# Patient Record
Sex: FEMALE | Race: WHITE | NOT HISPANIC OR LATINO | Employment: OTHER | URBAN - METROPOLITAN AREA
[De-identification: names, ages, dates, MRNs, and addresses within clinical notes are randomized per-mention and may not be internally consistent; named-entity substitution may affect disease eponyms.]

---

## 2024-01-01 ENCOUNTER — TELEPHONE (OUTPATIENT)
Dept: INTERNAL MEDICINE CLINIC | Facility: OTHER | Age: 89
End: 2024-01-01

## 2024-10-21 ENCOUNTER — TELEPHONE (OUTPATIENT)
Age: 89
End: 2024-10-21

## 2024-10-21 NOTE — TELEPHONE ENCOUNTER
Received call from daughter. States that patient is going to be becoming a patient of the practice. She currently resides at [facility]. They need office to send over something to office requesting medical records. The fax number for them is [phone]. They will then fax over her records, and the facility will call to get her new patient visit set up.   Please advise.

## 2024-10-22 NOTE — TELEPHONE ENCOUNTER
Patients daughter stopped by office to sign medical records release form.  Form was faxed to [phone] and scanned into patients chart.

## 2024-10-22 NOTE — TELEPHONE ENCOUNTER
Called and spoke to patients daughter to find out who her previous primary care doctor was.  She used to see Reji Rob at  [facility].  Form has been completed and daughter will stop by the office this afternoon to sign release form so that we can fax request to Dr. Rob' Office.

## 2024-10-30 NOTE — TELEPHONE ENCOUNTER
Pt's daughter returning call; she did call the insurance and they stated it would be effective as of 11/1 which appt is rescheduled for now w/ Barbara Guerra. Did not obtain a ref # at time of call.

## 2024-10-30 NOTE — TELEPHONE ENCOUNTER
Received an email from Cammie @ [facility] looking to get PT/OT orders placed as soon as possible. I advised her that Dr. Wing is out of the office and if they need these orders ASAP Isaura would need to be seen in the office by one of our other providers.    Before Isaura can be seen, her PCP under her [employer] insurance needs to be changed to either our office or one of our providers in our office. I called and spoke with Marcie (patients daughter) and informed her [facility] would like to get her seen ASAP for these orders and asked her to call and get the PCP switched so we can get her in for an appointment. Marcie understood and told me she would be heading over to see her mom later today and will work on the switch with Isaura.     Also told Marcie to tell insurance to make the change effective immediately and to obtain a ref # for our documentation

## 2024-11-01 ENCOUNTER — TELEPHONE (OUTPATIENT)
Age: 89
End: 2024-11-01

## 2024-11-01 ENCOUNTER — OFFICE VISIT (OUTPATIENT)
Dept: INTERNAL MEDICINE CLINIC | Facility: OTHER | Age: 89
End: 2024-11-01
Payer: COMMERCIAL

## 2024-11-01 VITALS
SYSTOLIC BLOOD PRESSURE: 110 MMHG | OXYGEN SATURATION: 99 % | DIASTOLIC BLOOD PRESSURE: 68 MMHG | BODY MASS INDEX: 39.49 KG/M2 | HEART RATE: 71 BPM | WEIGHT: 237 LBS | HEIGHT: 65 IN | TEMPERATURE: 97.2 F

## 2024-11-01 DIAGNOSIS — S91.301A NON-HEALING WOUND OF RIGHT HEEL: ICD-10-CM

## 2024-11-01 DIAGNOSIS — I10 ESSENTIAL HYPERTENSION: Primary | ICD-10-CM

## 2024-11-01 DIAGNOSIS — Z95.3 S/P TAVR (TRANSCATHETER AORTIC VALVE REPLACEMENT), BIOPROSTHETIC: ICD-10-CM

## 2024-11-01 DIAGNOSIS — E55.9 VITAMIN D DEFICIENCY: ICD-10-CM

## 2024-11-01 DIAGNOSIS — Z87.81 S/P ORIF (OPEN REDUCTION INTERNAL FIXATION) FRACTURE: ICD-10-CM

## 2024-11-01 DIAGNOSIS — I50.32 CHRONIC DIASTOLIC HEART FAILURE (HCC): ICD-10-CM

## 2024-11-01 DIAGNOSIS — N18.4 CHRONIC KIDNEY DISEASE, STAGE 4 (SEVERE) (HCC): Chronic | ICD-10-CM

## 2024-11-01 DIAGNOSIS — S72.91XD CLOSED FRACTURE OF RIGHT FEMUR WITH ROUTINE HEALING, UNSPECIFIED FRACTURE MORPHOLOGY, UNSPECIFIED PORTION OF FEMUR, SUBSEQUENT ENCOUNTER: ICD-10-CM

## 2024-11-01 DIAGNOSIS — I73.9 PVD (PERIPHERAL VASCULAR DISEASE) (HCC): ICD-10-CM

## 2024-11-01 DIAGNOSIS — E21.3 HYPERPARATHYROIDISM (HCC): ICD-10-CM

## 2024-11-01 DIAGNOSIS — Z98.890 S/P ORIF (OPEN REDUCTION INTERNAL FIXATION) FRACTURE: ICD-10-CM

## 2024-11-01 DIAGNOSIS — Z76.89 ESTABLISHING CARE WITH NEW DOCTOR, ENCOUNTER FOR: ICD-10-CM

## 2024-11-01 DIAGNOSIS — D50.0 IRON DEFICIENCY ANEMIA DUE TO CHRONIC BLOOD LOSS: ICD-10-CM

## 2024-11-01 DIAGNOSIS — E89.0 POSTOPERATIVE HYPOTHYROIDISM: ICD-10-CM

## 2024-11-01 DIAGNOSIS — K21.9 GASTROESOPHAGEAL REFLUX DISEASE, UNSPECIFIED WHETHER ESOPHAGITIS PRESENT: ICD-10-CM

## 2024-11-01 DIAGNOSIS — E11.65 TYPE 2 DIABETES MELLITUS WITH HYPERGLYCEMIA, WITHOUT LONG-TERM CURRENT USE OF INSULIN (HCC): ICD-10-CM

## 2024-11-01 DIAGNOSIS — I50.32 CHRONIC DIASTOLIC (CONGESTIVE) HEART FAILURE (HCC): ICD-10-CM

## 2024-11-01 DIAGNOSIS — I25.10 ARTERIOSCLEROSIS OF CORONARY ARTERY: ICD-10-CM

## 2024-11-01 DIAGNOSIS — R26.2 AMBULATORY DYSFUNCTION: ICD-10-CM

## 2024-11-01 DIAGNOSIS — S91.302A NON-HEALING WOUND OF LEFT HEEL: ICD-10-CM

## 2024-11-01 DIAGNOSIS — I48.92 ATRIAL FLUTTER, UNSPECIFIED TYPE (HCC): ICD-10-CM

## 2024-11-01 PROBLEM — I95.9 HYPOTENSION: Status: RESOLVED | Noted: 2024-11-01 | Resolved: 2024-11-01

## 2024-11-01 PROBLEM — E87.1 HYPONATREMIA: Status: RESOLVED | Noted: 2024-08-22 | Resolved: 2024-11-01

## 2024-11-01 PROBLEM — N18.32 STAGE 3B CHRONIC KIDNEY DISEASE (HCC): Status: ACTIVE | Noted: 2020-07-21

## 2024-11-01 PROBLEM — K76.0 STEATOSIS OF LIVER: Status: ACTIVE | Noted: 2022-02-11

## 2024-11-01 PROBLEM — I95.9 HYPOTENSION: Status: ACTIVE | Noted: 2024-11-01

## 2024-11-01 PROBLEM — U07.1 COVID-19 VIRUS INFECTION: Status: RESOLVED | Noted: 2024-11-01 | Resolved: 2024-11-01

## 2024-11-01 PROBLEM — Z95.2 HISTORY OF AORTIC VALVE REPLACEMENT: Status: ACTIVE | Noted: 2018-06-14

## 2024-11-01 PROBLEM — E87.1 HYPONATREMIA: Status: ACTIVE | Noted: 2024-08-22

## 2024-11-01 PROBLEM — N32.81 OAB (OVERACTIVE BLADDER): Status: ACTIVE | Noted: 2024-09-23

## 2024-11-01 PROBLEM — E66.01 MORBID OBESITY (HCC): Status: RESOLVED | Noted: 2018-06-28 | Resolved: 2024-11-01

## 2024-11-01 PROBLEM — I5A MYOCARDIAL INJURY: Status: ACTIVE | Noted: 2024-09-23

## 2024-11-01 PROBLEM — E66.01 MORBID OBESITY (HCC): Status: ACTIVE | Noted: 2018-06-28

## 2024-11-01 PROBLEM — B35.3 TINEA PEDIS OF BOTH FEET: Status: ACTIVE | Noted: 2024-05-23

## 2024-11-01 PROBLEM — L85.8 KERATOACANTHOMA: Status: ACTIVE | Noted: 2024-07-23

## 2024-11-01 PROBLEM — N17.9 AKI (ACUTE KIDNEY INJURY) (HCC): Status: ACTIVE | Noted: 2024-09-22

## 2024-11-01 PROBLEM — M17.10 ARTHRITIS OF KNEE: Status: ACTIVE | Noted: 2024-07-23

## 2024-11-01 PROBLEM — D64.9 ANEMIA: Status: ACTIVE | Noted: 2024-01-09

## 2024-11-01 PROBLEM — S72.91XA FEMUR FRACTURE, RIGHT (HCC): Status: ACTIVE | Noted: 2024-08-20

## 2024-11-01 PROBLEM — U07.1 COVID-19 VIRUS INFECTION: Status: ACTIVE | Noted: 2024-11-01

## 2024-11-01 PROBLEM — N17.9 AKI (ACUTE KIDNEY INJURY) (HCC): Status: RESOLVED | Noted: 2024-09-22 | Resolved: 2024-11-01

## 2024-11-01 PROBLEM — Z95.1 POSTSURGICAL AORTOCORONARY BYPASS STATUS: Status: ACTIVE | Noted: 2018-06-14

## 2024-11-01 PROCEDURE — 99205 OFFICE O/P NEW HI 60 MIN: CPT | Performed by: NURSE PRACTITIONER

## 2024-11-01 RX ORDER — LANOLIN ALCOHOL/MO/W.PET/CERES
1000 CREAM (GRAM) TOPICAL DAILY
COMMUNITY
Start: 2024-06-25

## 2024-11-01 RX ORDER — SPIRONOLACTONE 25 MG/1
25 TABLET ORAL DAILY
COMMUNITY
Start: 2024-01-18 | End: 2025-01-17

## 2024-11-01 RX ORDER — AMMONIUM LACTATE 12 G/100G
CREAM TOPICAL
COMMUNITY
Start: 2024-10-22 | End: 2025-10-22

## 2024-11-01 RX ORDER — FLUTICASONE PROPIONATE 50 MCG
1 SPRAY, SUSPENSION (ML) NASAL 2 TIMES DAILY
COMMUNITY
Start: 2024-10-21

## 2024-11-01 RX ORDER — PANTOPRAZOLE SODIUM 40 MG/1
40 TABLET, DELAYED RELEASE ORAL DAILY
COMMUNITY
Start: 2024-10-21

## 2024-11-01 RX ORDER — METOPROLOL SUCCINATE 100 MG/1
100 TABLET, EXTENDED RELEASE ORAL DAILY
COMMUNITY
Start: 2024-10-21

## 2024-11-01 RX ORDER — FUROSEMIDE 20 MG/1
20 TABLET ORAL DAILY
COMMUNITY
Start: 2023-11-15 | End: 2024-11-14

## 2024-11-01 RX ORDER — ACETAMINOPHEN 325 MG/1
650 TABLET ORAL EVERY 4 HOURS PRN
COMMUNITY

## 2024-11-01 RX ORDER — OSTOMY SUPPLY
BOX MISCELLANEOUS
COMMUNITY
Start: 2024-10-21

## 2024-11-01 RX ORDER — ATORVASTATIN CALCIUM 40 MG/1
40 TABLET, FILM COATED ORAL DAILY
COMMUNITY
Start: 2024-10-21

## 2024-11-01 RX ORDER — LEVOTHYROXINE SODIUM 125 UG/1
125 TABLET ORAL DAILY
COMMUNITY
Start: 2024-10-21

## 2024-11-01 RX ORDER — GABAPENTIN 300 MG/1
300 CAPSULE ORAL 2 TIMES DAILY
COMMUNITY
Start: 2024-10-21

## 2024-11-01 RX ORDER — SOLIFENACIN SUCCINATE 10 MG/1
1 TABLET, FILM COATED ORAL DAILY
COMMUNITY
Start: 2024-07-22

## 2024-11-01 NOTE — TELEPHONE ENCOUNTER
Cammie is requesting to cancel Referrals for [facility] that were placed today. Instead she is asking referral for Visiting Nurse wound care with [facility]. Please reach outt to her for any questions.    Thank you!!

## 2024-11-01 NOTE — ASSESSMENT & PLAN NOTE
Using wheelchair status post hip fracture in August 2024  prior to that was using walker  Continue PT OT

## 2024-11-01 NOTE — ASSESSMENT & PLAN NOTE
Pressure well-controlled on current regimen  Continue Toprol  mg daily, spironolactone 25 mg daily and Lasix 20 mg daily

## 2024-11-01 NOTE — ASSESSMENT & PLAN NOTE
Wt Readings from Last 3 Encounters:   11/01/24 108 kg (237 lb)     Follows with [facility] cardiology  Managed with Lasix 20 mg daily  Continue daily weights

## 2024-11-01 NOTE — TELEPHONE ENCOUNTER
Why was I not made aware of the wounds prior to her visit today? We have asked countless times that we please be given all information on patient that needs to be addressed during the visit. I got a message from [facility] that she is already scheduled on 11/5. Does this need to now be cancelled? Is she able to have both [facility] and [facility]?

## 2024-11-01 NOTE — PROGRESS NOTES
Assessment/Plan:    Problem List Items Addressed This Visit       Ambulatory dysfunction     Using wheelchair status post hip fracture in August 2024  prior to that was using walker  Continue PT OT         Relevant Orders    Ambulatory Referral to Physical Therapy    Ambulatory Referral to Occupational Therapy    Arteriosclerosis of coronary artery     Follows with [facility] cardiology  Managed on atorvastatin 40 mg daily metoprolol  mg daily         Relevant Medications    metoprolol succinate (TOPROL-XL) 100 mg 24 hr tablet    Other Relevant Orders    Lipid Panel with Direct LDL reflex    Atrial flutter (HCC)     Follows with [facility] cardiology  Managed on Eliquis 5 mg twice daily metoprolol  mg daily         Relevant Medications    metoprolol succinate (TOPROL-XL) 100 mg 24 hr tablet    Chronic diastolic heart failure (HCC)     Wt Readings from Last 3 Encounters:   11/01/24 108 kg (237 lb)     Follows with [facility] cardiology  Managed with Lasix 20 mg daily  Continue daily weights               Relevant Medications    metoprolol succinate (TOPROL-XL) 100 mg 24 hr tablet    Chronic kidney disease, stage 4 (severe) (HCC) (Chronic)     Lab Results   Component Value Date    EGFR 41 (L) 09/25/2024    EGFR 33 (L) 09/24/2024    EGFR 19 (L) 09/23/2024    CREATININE 1.24 (H) 09/25/2024    CREATININE 1.51 (H) 09/24/2024    CREATININE 2.39 (H) 09/23/2024     Follows with  nephrology. Advised to scheduled follow up  Recently hospitalized for HERMAN 9/22/24 which was secondary to dehydration in the setting of covid illness   - recent Cr 1.24  -avoid nephrotoxins          Relevant Medications    furosemide (LASIX) 20 mg tablet    spironolactone (ALDACTONE) 25 mg tablet    Other Relevant Orders    Comprehensive metabolic panel    CBC and differential    Femur fracture, right (HCC)     - due to fall at home   - s/p ORIF 8/20 with [facility]  - currently weight bearing as tolerated  - needs referral to PT  and OT at [facility]   - currently using wheelchair. Was using walker prior to fall         Relevant Orders    Ambulatory Referral to Physical Therapy    Ambulatory Referral to Occupational Therapy    Establishing care with new doctor, encounter for     Medical history reviewed with patient and daughter in detail         Essential hypertension - Primary     Pressure well-controlled on current regimen  Continue Toprol  mg daily, spironolactone 25 mg daily and Lasix 20 mg daily         Relevant Medications    furosemide (LASIX) 20 mg tablet    metoprolol succinate (TOPROL-XL) 100 mg 24 hr tablet    spironolactone (ALDACTONE) 25 mg tablet    Esophageal reflux     Controlled on Protonix 40 mg daily         Relevant Medications    pantoprazole (PROTONIX) 40 mg tablet    Hyperparathyroidism ([facility])     Follows with nephrology  Update PTH and CMP         Relevant Orders    PTH, intact    Iron deficiency anemia due to chronic blood loss     Her daughter was previously secondary to severe aortic stenosis  Resolved status post TAVR         Relevant Medications    vitamin B-12 (VITAMIN B-12) 1,000 mcg tablet    Postoperative hypothyroidism     TSH normal  Continue levothyroxine 125 mcg daily         Relevant Medications    levothyroxine 125 mcg tablet    metoprolol succinate (TOPROL-XL) 100 mg 24 hr tablet    Other Relevant Orders    TSH, 3rd generation with Free T4 reflex    PVD (peripheral vascular disease) ([facility])     Continue atorvastatin 40 mg daily         S/p TAVR (transcatheter aortic valve replacement), bioprosthetic      severe stenosis of her prior Corona Lilia TAVR valve from 2018 and she underwent successful TAVR in TAVR with implantation of a 26 mm Medtronic Evolut FX on 2/15/24  -Follows with [facility] cardiology         Type 2 diabetes mellitus with hyperglycemia, without long-term current use of insulin ([facility])       Lab Results   Component Value Date    HGBA1C 6.5 (H) 09/04/2024  "    Diet controlled         Relevant Orders    Hemoglobin A1C    Vitamin D deficiency     Update vitamin D level         Relevant Orders    Vitamin D 25 hydroxy     Other Visit Diagnoses       S/P ORIF (open reduction internal fixation) fracture        Relevant Orders    Ambulatory Referral to Physical Therapy    Ambulatory Referral to Occupational Therapy    Chronic diastolic (congestive) heart failure (HCC)        Relevant Medications    metoprolol succinate (TOPROL-XL) 100 mg 24 hr tablet    Non-healing wound of right heel        Relevant Orders    Ambulatory Referral to Wound Care    Non-healing wound of left heel        Relevant Orders    Ambulatory Referral to Wound Care            BMI Counseling: Body mass index is 39.44 kg/m².          [employer]) software was used to dictate this note.  It may contain errors with dictating incorrect words or incorrect spelling. Please contact the provider directly with any questions.    Subjective:      Patient ID: Isaura Burnett is a 89 y.o. female.    HPI    Patient presents today to establish care with our office  She was previously following with Dr Rob with  internal medicine.  She is accompanied by her daughter Hina.   She recently moved in to [facility] 2 weeks ago. Prior to that she was at [facility] for rehab after she had a right hip fracture s/p ORIF on 8/20/24 after a fall at home.  She is following with [facility].  She is currently weightbearing as tolerated to the right lower extremity.   She needs a referral for PT and OT today     DM2- Hba1c 6.5, diet controlled     Hypothyroidism - TSH normal 2.64.  Managed on levothyroxine 125 mcg daily    Follows with  cardiology Dr Yee   -History of coronary artery disease  -History of heart block requiring permanent pacemaker  -History of chronic diastolic heart failure-managed with Lasix 20 mg daily  -Status post TAVR \"she had severe stenosis of her prior Corona Lilia TAVR valve " "from 2018 and she underwent successful TAVR in TAVR with implantation of a 26 mm Medtronic Evolut FX on 2/15/24. By exam and clinically the valve is functioning normally. I personally reviewed her 30 day echo done earlier today which has not yet been officially interpreted. It is a technically difficult study with poor visualization of the TAVR valves, but the valve is well seated and there does not appear to be significant regurgitation or paravalvular leak.\" Copied from last cardiology follow up 3/18/24  -Her daughter reports that her history of anemia was secondary to her severe aortic stenosis.  He states her anemia has resolved after her TAVR    Follows with  nephrology Dr Sadler -history of stage IV chronic kidney disease.  Per last nephrology note baseline (1.5 - 1.6 mg/dL). Most recent Cr 1.24 after recent hospitalization for HERMAN secondary to dehydration secondary to COVID virus.  He is due for follow-up with nephrology        The following portions of the patient's history were reviewed and updated as appropriate: allergies, current medications, past family history, past medical history, past social history, past surgical history, and problem list.    Review of Systems   Constitutional:  Negative for activity change, appetite change and unexpected weight change.   Respiratory:  Negative for cough, chest tightness and shortness of breath.    Cardiovascular:  Positive for leg swelling. Negative for chest pain.         Past Medical History:   Diagnosis Date    A-fib ([facility])     HERMAN (acute kidney injury) ([facility]) 09/22/2024    CHF (congestive heart failure) ([facility])     COVID-19 virus infection 11/01/2024         Current Outpatient Medications:     acetaminophen (TYLENOL) 325 mg tablet, Take 650 mg by mouth every 4 (four) hours as needed for mild pain or fever, Disp: , Rfl:     ammonium lactate (LAC-HYDRIN) 12 % cream, Apply topically, Disp: , Rfl:     apixaban (ELIQUIS) 5 mg, Take 5 mg by mouth 2 (two) times a day, " "Disp: , Rfl:     atorvastatin (LIPITOR) 40 mg tablet, Take 40 mg by mouth daily, Disp: , Rfl:     fluticasone (FLONASE) 50 mcg/act nasal spray, 1 spray into each nostril 2 (two) times a day, Disp: , Rfl:     furosemide (LASIX) 20 mg tablet, Take 20 mg by mouth daily, Disp: , Rfl:     gabapentin (NEURONTIN) 300 mg capsule, Take 300 mg by mouth 2 (two) times a day, Disp: , Rfl:     levothyroxine 125 mcg tablet, Take 125 mcg by mouth daily, Disp: , Rfl:     Menthol, Topical Analgesic, (BIOFREEZE ROLL-ON EX), Apply topically 2 (two) times a day, Disp: , Rfl:     metoprolol succinate (TOPROL-XL) 100 mg 24 hr tablet, Take 100 mg by mouth daily, Disp: , Rfl:     Ostomy Supplies (Skin Prep Wipes) MISC, Apply to right heel twice daily, Disp: , Rfl:     pantoprazole (PROTONIX) 40 mg tablet, Take 40 mg by mouth daily, Disp: , Rfl:     solifenacin (VESICARE) 10 MG tablet, Take 1 tablet by mouth daily, Disp: , Rfl:     spironolactone (ALDACTONE) 25 mg tablet, Take 25 mg by mouth daily, Disp: , Rfl:     vitamin B-12 (VITAMIN B-12) 1,000 mcg tablet, Take 1,000 mcg by mouth daily, Disp: , Rfl:     No Known Allergies    Social History   Past Surgical History:   Procedure Laterality Date    BLADDER SUSPENSION      CARDIAC PACEMAKER PLACEMENT  02/2024    TVAR 2 weeks prior    CATARACT EXTRACTION      CHOLECYSTECTOMY      FEMUR SURGERY Right 08/2024    THYROIDECTOMY       Family History   Problem Relation Age of Onset    Heart disease Mother     Colon cancer Father        Objective:  /68 (BP Location: Left arm, Patient Position: Sitting, Cuff Size: Large)   Pulse 71   Temp (!) 97.2 °F (36.2 °C) (Temporal)   Ht 5' 5\" (1.651 m) Comment: pt verbal  Wt 108 kg (237 lb) Comment: pt verbal- pt was weighed this AM  SpO2 99%   BMI 39.44 kg/m²      Physical Exam  Vitals reviewed.   Constitutional:       General: She is not in acute distress.     Appearance: Normal appearance. She is obese. She is not diaphoretic.   HENT:      Right " Ear: Tympanic membrane and external ear normal.      Left Ear: Tympanic membrane and external ear normal.   Eyes:      Conjunctiva/sclera: Conjunctivae normal.   Cardiovascular:      Rate and Rhythm: Normal rate and regular rhythm.   Pulmonary:      Effort: Pulmonary effort is normal. No respiratory distress.      Breath sounds: Normal breath sounds. No wheezing, rhonchi or rales.   Musculoskeletal:      Right lower leg: Edema (+2 nonpitting) present.      Left lower leg: Edema (+2 nonpitting) present.   Neurological:      Mental Status: She is alert and oriented to person, place, and time. Mental status is at baseline.      Gait: Gait abnormal (in wheelchair).   Psychiatric:         Mood and Affect: Mood normal.         Behavior: Behavior normal.         Thought Content: Thought content normal.         Judgment: Judgment normal.

## 2024-11-01 NOTE — ASSESSMENT & PLAN NOTE
severe stenosis of her prior Corona Lilia TAVR valve from 2018 and she underwent successful TAVR in TAVR with implantation of a 26 mm Medtronic Evolut FX on 2/15/24  -Follows with [facility]

## 2024-11-01 NOTE — ASSESSMENT & PLAN NOTE
- due to fall at home   - s/p ORIF 8/20 with [facility]  - currently weight bearing as tolerated  - needs referral to PT and OT at [facility]   - currently using wheelchair. Was using walker prior to fall

## 2024-11-01 NOTE — TELEPHONE ENCOUNTER
Called and spoke with Cammie- Aware [facility] order with be Discontinued, but [facility] needs to contact the patient 's dgt to cancel appt with lucy on 11/5.  Per Rosa, Order will be placed for wound care for bilateral heel wounds to [facility] and referral to [facility]. Per Cammie from past resident's when they were referred to [facility] for wound care, [facility] then sent orders for PHYSICAL THERAPY/ OT

## 2024-11-01 NOTE — TELEPHONE ENCOUNTER
The patient already has an appointment. I am confused, I was told at the time of the visit that she could not use [facility] because they didn't not accept her insurance. The daughter said she was told [facility] goes there so that is why we put inh er PT/OT referrals for [facility]. [facility]? I'm confused why the wound care order affects the PT/OT order

## 2024-11-01 NOTE — ASSESSMENT & PLAN NOTE
Follows with [facility] cardiology  Managed on Eliquis 5 mg twice daily metoprolol  mg daily

## 2024-11-01 NOTE — ASSESSMENT & PLAN NOTE
Follows with [facility] cardiology  Managed on atorvastatin 40 mg daily metoprolol  mg daily

## 2024-11-01 NOTE — TELEPHONE ENCOUNTER
Cammie was reaching out with the following request:    Staff just informed me that Isaura has wounds on her heels that are re-opening from where she was prior to us... want to get her with [facility] if possible for wound care, because they can then put a referral for [facility] to see her through them.    She is asking if the referrals for [facility] can be canceled as soon as possible before they start the process of contacting the patient.

## 2024-11-01 NOTE — TELEPHONE ENCOUNTER
Reached out to Cammie about the situation.  She apologizes that the issue was not written on the paperwork.  She states that it was communicated to her late last night.  She is asking if the referrals for [facility] could be canceled and changed to [facility] because [facility] communicates the patients needs to them more then [facility] does.  She said that it is your decision though on what you would prefer to do.

## 2024-11-01 NOTE — ASSESSMENT & PLAN NOTE
Lab Results   Component Value Date    EGFR 41 (L) 09/25/2024    EGFR 33 (L) 09/24/2024    EGFR 19 (L) 09/23/2024    CREATININE 1.24 (H) 09/25/2024    CREATININE 1.51 (H) 09/24/2024    CREATININE 2.39 (H) 09/23/2024     Follows with  nephrology. Advised to scheduled follow up  Recently hospitalized for HERMAN 9/22/24 which was secondary to dehydration in the setting of covid illness   - recent Cr 1.24  -avoid nephrotoxins

## 2024-11-07 ENCOUNTER — TELEPHONE (OUTPATIENT)
Dept: INTERNAL MEDICINE CLINIC | Facility: OTHER | Age: 89
End: 2024-11-07

## 2024-11-07 NOTE — TELEPHONE ENCOUNTER
Patients daughter dropped off a [employer] form to be completed by Barbara Guerra.  The form has been scanned into patients chart and placed on Rosa's desk to address.  Patients daughter was made aware that there will be a $25 forms fee upon completion.

## 2024-11-08 ENCOUNTER — TELEPHONE (OUTPATIENT)
Dept: INTERNAL MEDICINE CLINIC | Facility: OTHER | Age: 89
End: 2024-11-08

## 2024-11-08 DIAGNOSIS — L30.4 INTERTRIGO: Primary | ICD-10-CM

## 2024-11-08 RX ORDER — NYSTATIN 100000 [USP'U]/G
POWDER TOPICAL 3 TIMES DAILY
Qty: 60 G | Refills: 2 | Status: SHIPPED | OUTPATIENT
Start: 2024-11-08

## 2024-11-12 ENCOUNTER — TELEPHONE (OUTPATIENT)
Dept: INTERNAL MEDICINE CLINIC | Facility: OTHER | Age: 89
End: 2024-11-12

## 2024-11-12 RX ORDER — POLYETHYLENE GLYCOL 3350 17 G/17G
17 POWDER, FOR SOLUTION ORAL EVERY OTHER DAY
COMMUNITY

## 2024-11-12 NOTE — TELEPHONE ENCOUNTER
Cammie from [facility] reaching out with the following request:    Requesting an order for Miralax Powder to be changed to every other day administration - per resident's request.

## 2024-11-12 NOTE — TELEPHONE ENCOUNTER
Cammie reaching out with the following request:     Requesting an order for a UA C&S - staff reporting strong/foul smelling urine. If appt is needed, please call the

## 2024-11-12 NOTE — TELEPHONE ENCOUNTER
Miralax not on patient's med list. Will add.    That is fine to change to every other day administration.  Okay to hold for loose stool.

## 2024-11-13 ENCOUNTER — TELEPHONE (OUTPATIENT)
Dept: INTERNAL MEDICINE CLINIC | Facility: OTHER | Age: 89
End: 2024-11-13

## 2024-11-13 ENCOUNTER — OFFICE VISIT (OUTPATIENT)
Dept: INTERNAL MEDICINE CLINIC | Facility: OTHER | Age: 89
End: 2024-11-13
Payer: COMMERCIAL

## 2024-11-13 VITALS
HEART RATE: 63 BPM | TEMPERATURE: 98.2 F | OXYGEN SATURATION: 95 % | WEIGHT: 243 LBS | HEIGHT: 65 IN | SYSTOLIC BLOOD PRESSURE: 128 MMHG | BODY MASS INDEX: 40.48 KG/M2 | DIASTOLIC BLOOD PRESSURE: 70 MMHG

## 2024-11-13 DIAGNOSIS — I10 ESSENTIAL HYPERTENSION: ICD-10-CM

## 2024-11-13 DIAGNOSIS — E66.01 OBESITY, MORBID (HCC): ICD-10-CM

## 2024-11-13 DIAGNOSIS — S31.000A WOUND OF SACRAL REGION, INITIAL ENCOUNTER: Primary | ICD-10-CM

## 2024-11-13 DIAGNOSIS — R82.90 ABNORMAL URINE ODOR: Primary | ICD-10-CM

## 2024-11-13 PROCEDURE — 99213 OFFICE O/P EST LOW 20 MIN: CPT

## 2024-11-13 PROCEDURE — G2211 COMPLEX E/M VISIT ADD ON: HCPCS

## 2024-11-13 RX ORDER — SENNA AND DOCUSATE SODIUM 50; 8.6 MG/1; MG/1
1 TABLET, FILM COATED ORAL
COMMUNITY

## 2024-11-13 NOTE — TELEPHONE ENCOUNTER
Cammie from [facility] reaching out with the following request:    Informed in middle shift nursing meeting - staff is using ZGuard on Isaura Gautamr's sacral area for two small sores (no depth, no drainage), however; this is non-effective.    Does have visiting nursing - wound nurse was informed - suggested switching to Calmoseptine BID  - can this be ordered please. Will continue to monitor to make sure it does not worsen.

## 2024-11-13 NOTE — PROGRESS NOTES
Ambulatory Visit  Name: Isaura Burnett      : 1934      MRN: [id number]  Encounter Provider: Nadia Bauer PA-C  Encounter Date: 2024   Encounter department: [facility]    Assessment & Plan  Abnormal urine odor  Patient unable to urinate in office as she stated she urinated prior to her appointment today  Will order urine analysis with reflex to culture to be collected at assisted living facility  Will wait to prescribe antibiotics until culture results obtained  Red flag symptoms discussed with patient and her daughter.  Any increased confusion, fevers, chills, significant abdominal pain and she should be evaluated in ER  Orders:    UA w Reflex to Microscopic w Reflex to Culture; Future    Obesity, morbid (HCC)           Essential hypertension  Pressure well-controlled on current regimen  Continue Toprol  mg daily, spironolactone 25 mg daily and Lasix 20 mg daily               History of Present Illness     Patient is a 89 year old female presenting to the office for concern of UTI   Patient reports unknown duration of symptoms  Patient describes burning sensation with urination  [facility] staff reports foul smelling urine   Patient notes she is incontinent.  Requires assistance from staff at assisted living facility for urination  Denies fevers, chills, abdominal pain, nausea, vomiting, altered mental status    She presents with her daughter today        Urinary Tract Infection   This is a new problem. Episode onset: unknown. The problem has been unchanged. The quality of the pain is described as burning. There has been no fever. Associated symptoms include frequency. Pertinent negatives include no chills, flank pain, hematuria, nausea, urgency or vomiting.       Review of Systems   Constitutional:  Negative for chills and fever.   Gastrointestinal:  Negative for abdominal pain, nausea and vomiting.   Genitourinary:  Positive for dysuria and  "frequency. Negative for flank pain, hematuria, pelvic pain and urgency.     Medical History Reviewed by provider this encounter:  Tobacco  Allergies  Meds  Problems  Med Hx  Surg Hx  Fam Hx           Objective     /70 (BP Location: Left arm, Patient Position: Sitting, Cuff Size: Large)   Pulse 63   Temp 98.2 °F (36.8 °C) (Temporal)   Ht 5' 5\" (1.651 m)   Wt 110 kg (243 lb)   SpO2 95%   BMI 40.44 kg/m²     Physical Exam  Vitals and nursing note reviewed.   Constitutional:       General: She is not in acute distress.     Appearance: Normal appearance. She is not ill-appearing.   HENT:      Head: Normocephalic and atraumatic.      Right Ear: External ear normal.      Left Ear: External ear normal.      Nose: Nose normal.      Mouth/Throat:      Mouth: Mucous membranes are moist.      Pharynx: Oropharynx is clear.   Eyes:      General: No scleral icterus.     Conjunctiva/sclera: Conjunctivae normal.      Pupils: Pupils are equal, round, and reactive to light.   Cardiovascular:      Rate and Rhythm: Normal rate and regular rhythm.      Heart sounds: Normal heart sounds. No murmur heard.     No friction rub. No gallop.   Pulmonary:      Effort: Pulmonary effort is normal. No respiratory distress.      Breath sounds: Normal breath sounds. No wheezing, rhonchi or rales.   Chest:      Chest wall: No tenderness.   Abdominal:      Palpations: Abdomen is soft.      Tenderness: There is no abdominal tenderness. There is no guarding or rebound.   Skin:     General: Skin is warm and dry.      Coloration: Skin is not pale.      Findings: No erythema.   Neurological:      General: No focal deficit present.      Mental Status: She is alert and oriented to person, place, and time. Mental status is at baseline.   Psychiatric:         Mood and Affect: Mood normal.         Behavior: Behavior normal.       Administrative Statements     Disclaimer: This note was generated with voice recognition software.  Phonetic, " grammatical, and spelling errors may be present as a result.  Please contact provider with any concerns or questions

## 2024-11-14 RX ORDER — ANORECTAL OINTMENT 15.7; .44; 24; 20.6 G/100G; G/100G; G/100G; G/100G
OINTMENT TOPICAL 2 TIMES DAILY
Qty: 100 G | Refills: 2 | Status: SHIPPED | OUTPATIENT
Start: 2024-11-14

## 2024-11-14 NOTE — TELEPHONE ENCOUNTER
Cammie called me concerns on waiting for the Calmoseptine.  Pt already being seen by Wound Care and feels delaying the cream could allow wounds to become worse delaying healing.

## 2024-11-14 NOTE — TELEPHONE ENCOUNTER
[facility] has been notified that it will be addressed at Friday's appointment and the information has been added to the appointment note.

## 2024-11-15 ENCOUNTER — OFFICE VISIT (OUTPATIENT)
Dept: INTERNAL MEDICINE CLINIC | Facility: OTHER | Age: 89
End: 2024-11-15
Payer: COMMERCIAL

## 2024-11-15 VITALS
OXYGEN SATURATION: 99 % | HEART RATE: 57 BPM | DIASTOLIC BLOOD PRESSURE: 78 MMHG | HEIGHT: 65 IN | SYSTOLIC BLOOD PRESSURE: 110 MMHG | BODY MASS INDEX: 39.99 KG/M2 | WEIGHT: 240 LBS | TEMPERATURE: 97.9 F

## 2024-11-15 DIAGNOSIS — E11.65 TYPE 2 DIABETES MELLITUS WITH HYPERGLYCEMIA, WITHOUT LONG-TERM CURRENT USE OF INSULIN (HCC): ICD-10-CM

## 2024-11-15 DIAGNOSIS — I10 ESSENTIAL HYPERTENSION: ICD-10-CM

## 2024-11-15 DIAGNOSIS — S31.000A WOUND OF SACRAL REGION, INITIAL ENCOUNTER: ICD-10-CM

## 2024-11-15 DIAGNOSIS — N18.4 CHRONIC KIDNEY DISEASE, STAGE 4 (SEVERE) (HCC): Chronic | ICD-10-CM

## 2024-11-15 DIAGNOSIS — R32 URINARY INCONTINENCE, UNSPECIFIED TYPE: ICD-10-CM

## 2024-11-15 DIAGNOSIS — I50.32 CHRONIC DIASTOLIC HEART FAILURE (HCC): Primary | ICD-10-CM

## 2024-11-15 DIAGNOSIS — S72.91XD CLOSED FRACTURE OF RIGHT FEMUR WITH ROUTINE HEALING, UNSPECIFIED FRACTURE MORPHOLOGY, UNSPECIFIED PORTION OF FEMUR, SUBSEQUENT ENCOUNTER: ICD-10-CM

## 2024-11-15 PROCEDURE — G2211 COMPLEX E/M VISIT ADD ON: HCPCS | Performed by: NURSE PRACTITIONER

## 2024-11-15 PROCEDURE — 99213 OFFICE O/P EST LOW 20 MIN: CPT | Performed by: NURSE PRACTITIONER

## 2024-11-15 NOTE — ASSESSMENT & PLAN NOTE
Lab Results   Component Value Date    EGFR 41 (L) 09/25/2024    EGFR 33 (L) 09/24/2024    EGFR 19 (L) 09/23/2024    CREATININE 1.24 (H) 09/25/2024    CREATININE 1.51 (H) 09/24/2024    CREATININE 2.39 (H) 09/23/2024     Lab Results   Component Value Date    EGFR 41 (L) 09/25/2024    EGFR 33 (L) 09/24/2024    EGFR 19 (L) 09/23/2024    CREATININE 1.24 (H) 09/25/2024    CREATININE 1.51 (H) 09/24/2024    CREATININE 2.39 (H) 09/23/2024     Follows with  nephrology.   - recent Cr 1.24  -avoid nephrotoxins

## 2024-11-15 NOTE — TELEPHONE ENCOUNTER
Forms have been completed and daughter planning on picking up next week sometime.  Forms fee of $25 has been entered and the forms have been scanned into patients chart.

## 2024-11-15 NOTE — PROGRESS NOTES
Assessment/Plan:    Problem List Items Addressed This Visit       Chronic diastolic heart failure (HCC) - Primary      Wt Readings from Last 3 Encounters:   11/15/24 109 kg (240 lb)   11/13/24 110 kg (243 lb)   11/01/24 108 kg (237 lb)     Follows with [facility] cardiology  Managed with Lasix 20 mg daily  Continue daily weights                       Chronic kidney disease, stage 4 (severe) ([facility]) (Chronic)    Lab Results   Component Value Date    EGFR 41 (L) 09/25/2024    EGFR 33 (L) 09/24/2024    EGFR 19 (L) 09/23/2024    CREATININE 1.24 (H) 09/25/2024    CREATININE 1.51 (H) 09/24/2024    CREATININE 2.39 (H) 09/23/2024     Lab Results   Component Value Date    EGFR 41 (L) 09/25/2024    EGFR 33 (L) 09/24/2024    EGFR 19 (L) 09/23/2024    CREATININE 1.24 (H) 09/25/2024    CREATININE 1.51 (H) 09/24/2024    CREATININE 2.39 (H) 09/23/2024     Follows with  nephrology.   - recent Cr 1.24  -avoid nephrotoxins          Femur fracture, right ([facility])    - due to fall at home   - s/p ORIF 8/20 with [facility]  - has PT coming into [place]  - she is wheelchair bound at this time. Per family she is unable to ambulate with walker at this time. She is having difficulty moving her right foot forward          Essential hypertension    Pressure well-controlled on current regimen  Continue Toprol  mg daily, spironolactone 25 mg daily and Lasix 20 mg daily            Type 2 diabetes mellitus with hyperglycemia, without long-term current use of insulin ([facility])      Lab Results   Component Value Date    HGBA1C 6.5 (H) 09/04/2024     Diet controlled         Incontinence    Incontinence of bowel and bladder   Toileted by staff at assisted living  Uses depends          Sacral wound    Following with [facility] wound care  Requested order for Calmoseptine provided                  [employer] software was used to dictate this note.  It may contain errors with dictating incorrect words or incorrect spelling. Please contact the  provider directly with any questions.    Subjective:      Patient ID: Isaura Burnett is a 89 y.o. female.    HPI    Patient presents today accompnaied by her family to complete forms for [employer] benefits.   She was recently seen earlier this week for foul-smelling urine but denies any other UTI symptoms.  Her urine is still pending.   She is following with [facility] wound care for bilateral heel wounds which her family tell me are improving as well as a sacral wound.  They requested switching from Z guard to Calmoseptine which was ordered yesterday.   Patient denies any new complaints    The following portions of the patient's history were reviewed and updated as appropriate: allergies, current medications, past family history, past medical history, past social history, past surgical history, and problem list.    Review of Systems   Constitutional:  Negative for chills and fever.   Genitourinary:         Incontinence bowel and bladder   Musculoskeletal:  Positive for gait problem.         Past Medical History:   Diagnosis Date    A-fib ([facility])     HERMAN (acute kidney injury) ([facility]) 09/22/2024    CHF (congestive heart failure) ([facility])     COVID-19 virus infection 11/01/2024         Current Outpatient Medications:     acetaminophen (TYLENOL) 325 mg tablet, Take 650 mg by mouth every 4 (four) hours as needed for mild pain or fever, Disp: , Rfl:     ammonium lactate (LAC-HYDRIN) 12 % cream, Apply topically Apply topically to affected areas as needed for dry skin *keep at bedside*, Disp: , Rfl:     apixaban (ELIQUIS) 5 mg, Take 5 mg by mouth 2 (two) times a day, Disp: , Rfl:     atorvastatin (LIPITOR) 40 mg tablet, Take 40 mg by mouth daily, Disp: , Rfl:     fluticasone (FLONASE) 50 mcg/act nasal spray, 1 spray into each nostril 2 (two) times a day, Disp: , Rfl:     furosemide (LASIX) 20 mg tablet, Take 20 mg by mouth daily, Disp: , Rfl:     gabapentin (NEURONTIN) 300 mg capsule, Take 300 mg by mouth 2 (two) times a day, Disp: ,  "Rfl:     levothyroxine 125 mcg tablet, Take 125 mcg by mouth daily, Disp: , Rfl:     Menthol, Topical Analgesic, (BIOFREEZE ROLL-ON EX), Apply topically 2 (two) times a day, Disp: , Rfl:     menthol-zinc oxide (Calmoseptine) 0.44-20.6 % OINT, Apply topically 2 (two) times a day, Disp: 100 g, Rfl: 2    metoprolol succinate (TOPROL-XL) 100 mg 24 hr tablet, Take 100 mg by mouth daily, Disp: , Rfl:     nystatin (MYCOSTATIN) powder, Apply topically 3 (three) times a day, Disp: 60 g, Rfl: 2    Ostomy Supplies (Skin Prep Wipes) MISC, Apply to right heel twice daily, Disp: , Rfl:     pantoprazole (PROTONIX) 40 mg tablet, Take 40 mg by mouth daily, Disp: , Rfl:     polyethylene glycol (GLYCOLAX) 17 GM/SCOOP powder, Take 17 g by mouth every other day Okay to hold for loose stool., Disp: , Rfl:     senna-docusate sodium (SENOKOT-S) 8.6-50 mg per tablet, Take 1 tablet by mouth daily at bedtime, Disp: , Rfl:     solifenacin (VESICARE) 10 MG tablet, Take 1 tablet by mouth daily, Disp: , Rfl:     spironolactone (ALDACTONE) 25 mg tablet, Take 25 mg by mouth daily, Disp: , Rfl:     vitamin B-12 (VITAMIN B-12) 1,000 mcg tablet, Take 1,000 mcg by mouth daily, Disp: , Rfl:     No Known Allergies    Social History   Past Surgical History:   Procedure Laterality Date    BLADDER SUSPENSION      CARDIAC PACEMAKER PLACEMENT  02/2024    TVAR 2 weeks prior    CATARACT EXTRACTION      CHOLECYSTECTOMY      FEMUR SURGERY Right 08/2024    THYROIDECTOMY       Family History   Problem Relation Age of Onset    Heart disease Mother     Colon cancer Father        Objective:  /78 (BP Location: Left arm, Patient Position: Sitting, Cuff Size: Standard)   Pulse 57   Temp 97.9 °F (36.6 °C) (Temporal)   Ht 5' 5\" (1.651 m)   Wt 109 kg (240 lb) Comment: verbal, weighed this morning  SpO2 99%   BMI 39.94 kg/m²      Physical Exam  Vitals reviewed.   Constitutional:       General: She is not in acute distress.     Appearance: Normal appearance. She " is obese. She is not diaphoretic.   HENT:      Head: Normocephalic and atraumatic.   Eyes:      Extraocular Movements: Extraocular movements intact.      Conjunctiva/sclera: Conjunctivae normal.      Pupils: Pupils are equal, round, and reactive to light.   Cardiovascular:      Rate and Rhythm: Normal rate and regular rhythm.      Heart sounds: Murmur heard.   Pulmonary:      Effort: Pulmonary effort is normal. No respiratory distress.      Breath sounds: Normal breath sounds. No wheezing, rhonchi or rales.   Musculoskeletal:      Right lower leg: Edema present.      Left lower leg: Edema present.   Skin:     Comments: Pink discoloration over sacrum    Neurological:      Mental Status: She is alert and oriented to person, place, and time. Mental status is at baseline.      Motor: Weakness (bilateral lower extremities) present.      Gait: Gait abnormal (in wheelchair).   Psychiatric:         Mood and Affect: Mood normal.         Behavior: Behavior normal.

## 2024-11-15 NOTE — TELEPHONE ENCOUNTER
[facility] called to report patient had upcoming appt with [facility] Monday 11/18/24 @ 10:00 am for Diabetic Foot exam.      Please review.  Thank you

## 2024-11-15 NOTE — ASSESSMENT & PLAN NOTE
Wt Readings from Last 3 Encounters:   11/15/24 109 kg (240 lb)   11/13/24 110 kg (243 lb)   11/01/24 108 kg (237 lb)     Follows with [facility] cardiology  Managed with Lasix 20 mg daily  Continue daily weights

## 2024-11-15 NOTE — ASSESSMENT & PLAN NOTE
- due to fall at home   - s/p ORIF 8/20 with [facility]  - has PT coming into [place]  - she is wheelchair bound at this time. Per family she is unable to ambulate with walker at this time. She is having difficulty moving her right foot forward

## 2024-11-19 ENCOUNTER — APPOINTMENT (OUTPATIENT)
Dept: LAB | Facility: IMAGING CENTER | Age: 89
End: 2024-11-19
Payer: COMMERCIAL

## 2024-11-19 DIAGNOSIS — I10 ESSENTIAL HYPERTENSION, MALIGNANT: ICD-10-CM

## 2024-11-19 LAB
ANION GAP SERPL CALCULATED.3IONS-SCNC: 9 MMOL/L (ref 4–13)
BUN SERPL-MCNC: 31 MG/DL (ref 5–25)
CALCIUM SERPL-MCNC: 9.3 MG/DL (ref 8.4–10.2)
CHLORIDE SERPL-SCNC: 102 MMOL/L (ref 96–108)
CO2 SERPL-SCNC: 27 MMOL/L (ref 21–32)
CREAT SERPL-MCNC: 1.43 MG/DL (ref 0.6–1.3)
GFR SERPL CREATININE-BSD FRML MDRD: 32 ML/MIN/1.73SQ M
GLUCOSE SERPL-MCNC: 201 MG/DL (ref 65–140)
POTASSIUM SERPL-SCNC: 4.3 MMOL/L (ref 3.5–5.3)
SODIUM SERPL-SCNC: 138 MMOL/L (ref 135–147)

## 2024-11-19 PROCEDURE — 80048 BASIC METABOLIC PNL TOTAL CA: CPT

## 2024-11-19 PROCEDURE — 36415 COLL VENOUS BLD VENIPUNCTURE: CPT

## 2024-11-20 ENCOUNTER — TELEPHONE (OUTPATIENT)
Dept: INTERNAL MEDICINE CLINIC | Facility: OTHER | Age: 89
End: 2024-11-20

## 2024-11-20 DIAGNOSIS — N30.01 ACUTE CYSTITIS WITH HEMATURIA: Primary | ICD-10-CM

## 2024-11-20 RX ORDER — CEPHALEXIN 500 MG/1
500 CAPSULE ORAL 2 TIMES DAILY
Qty: 14 CAPSULE | Refills: 0 | Status: SHIPPED | OUTPATIENT
Start: 2024-11-20 | End: 2024-11-27

## 2024-11-20 NOTE — TELEPHONE ENCOUNTER
Message from Cammie:    we obtained a UA on 11/13 and it ended up being picked up by [facility]. It showed that on the 14th it was sent over for review. Do you know the results?     Looks like we never received results.  Cammie just re-faxed them and they are scanned into patients chart for review.

## 2024-11-21 ENCOUNTER — RESULTS FOLLOW-UP (OUTPATIENT)
Dept: INTERNAL MEDICINE CLINIC | Facility: OTHER | Age: 89
End: 2024-11-21

## 2024-12-02 NOTE — TELEPHONE ENCOUNTER
Cammie mcgee   
Cammie reaching out with the following request:     Requesting an order for prn Nystatin powder to be kept at bedside for redness in belly fold/under breasts    
Med sent   
no

## 2024-12-10 ENCOUNTER — TELEPHONE (OUTPATIENT)
Age: 89
End: 2024-12-10

## 2024-12-10 NOTE — TELEPHONE ENCOUNTER
Sharath from [facility], pt is having significant Edema .. Sharath was wondering if Dr Wing could increase the dose  for Furosemide 40 mg. Dr Finch increased her dose from 20 mg to 40 mg on 11/19/24  . They're looking for a dose higher than 40 mg for at least 3 days.     [facility] - [place] [place] - [address]     Please advise    Call back# [phone]

## 2024-12-11 ENCOUNTER — OFFICE VISIT (OUTPATIENT)
Dept: INTERNAL MEDICINE CLINIC | Facility: OTHER | Age: 89
End: 2024-12-11
Payer: COMMERCIAL

## 2024-12-11 ENCOUNTER — TELEPHONE (OUTPATIENT)
Age: 89
End: 2024-12-11

## 2024-12-11 VITALS
HEART RATE: 61 BPM | SYSTOLIC BLOOD PRESSURE: 100 MMHG | WEIGHT: 210 LBS | OXYGEN SATURATION: 99 % | TEMPERATURE: 98.1 F | HEIGHT: 65 IN | DIASTOLIC BLOOD PRESSURE: 60 MMHG | BODY MASS INDEX: 34.99 KG/M2

## 2024-12-11 DIAGNOSIS — N18.32 STAGE 3B CHRONIC KIDNEY DISEASE (HCC): ICD-10-CM

## 2024-12-11 DIAGNOSIS — I50.32 CHRONIC DIASTOLIC HEART FAILURE (HCC): Primary | ICD-10-CM

## 2024-12-11 PROCEDURE — 99214 OFFICE O/P EST MOD 30 MIN: CPT | Performed by: NURSE PRACTITIONER

## 2024-12-11 PROCEDURE — G2211 COMPLEX E/M VISIT ADD ON: HCPCS | Performed by: NURSE PRACTITIONER

## 2024-12-11 RX ORDER — TORSEMIDE 20 MG/1
20 TABLET ORAL EVERY MORNING
Qty: 30 TABLET | Refills: 5 | Status: SHIPPED | OUTPATIENT
Start: 2024-12-11

## 2024-12-11 RX ORDER — POTASSIUM CHLORIDE 750 MG/1
10 CAPSULE, EXTENDED RELEASE ORAL DAILY
Qty: 30 CAPSULE | Refills: 5 | Status: SHIPPED | OUTPATIENT
Start: 2024-12-11 | End: 2024-12-19

## 2024-12-11 RX ORDER — FUROSEMIDE 40 MG/1
TABLET ORAL
COMMUNITY
Start: 2024-11-19 | End: 2024-12-11

## 2024-12-11 RX ORDER — TORSEMIDE 10 MG/1
TABLET ORAL
Qty: 30 TABLET | Refills: 5 | Status: SHIPPED | OUTPATIENT
Start: 2024-12-11 | End: 2024-12-19

## 2024-12-11 NOTE — ASSESSMENT & PLAN NOTE
Wt Readings from Last 3 Encounters:   12/11/24 95.3 kg (210 lb)   11/15/24 109 kg (240 lb)   11/13/24 110 kg (243 lb)     Discussed case with Dr. Wing  Discontinue Lasix  Start torsemide 20 mg in the morning 10 mg in the afternoon  Add potassium 10 mEq daily  Check BMP in 1 week and follow-up in office in 1 week  Recommend low-sodium diet

## 2024-12-11 NOTE — ASSESSMENT & PLAN NOTE
Lab Results   Component Value Date    EGFR 32 11/19/2024    EGFR 41 (L) 09/25/2024    EGFR 33 (L) 09/24/2024    CREATININE 1.43 (H) 11/19/2024    CREATININE 1.24 (H) 09/25/2024    CREATININE 1.51 (H) 09/24/2024     - see adjustments to diuretics  - advised to stay well hydrated  - repeat BMP with follow up in office in 1 week

## 2024-12-11 NOTE — TELEPHONE ENCOUNTER
Shaheed with [facility] called. Requesting clarification. States lasix was discontinued and the Pt was ordered torsemide. Would like to know if Pt is to discontinue spironolactone (ALDACTONE) 25 mg tablet ?        Please return call to Pharmacy and ask for Shaheed tel # [phone]. Thank you

## 2024-12-11 NOTE — PROGRESS NOTES
Assessment/Plan:    Problem List Items Addressed This Visit       Chronic diastolic heart failure (HCC) - Primary    Wt Readings from Last 3 Encounters:   12/11/24 95.3 kg (210 lb)   11/15/24 109 kg (240 lb)   11/13/24 110 kg (243 lb)     Discussed case with Dr. Wing  Discontinue Lasix  Start torsemide 20 mg in the morning 10 mg in the afternoon  Add potassium 10 mEq daily  Check BMP in 1 week and follow-up in office in 1 week  Recommend low-sodium diet                 Relevant Medications    torsemide (DEMADEX) 20 mg tablet    torsemide (DEMADEX) 10 mg tablet    potassium chloride (MICRO-K) 10 MEQ CR capsule    Other Relevant Orders    Basic metabolic panel    B-Type Natriuretic Peptide(BNP)    Stage 3b chronic kidney disease (HCC)    Lab Results   Component Value Date    EGFR 32 11/19/2024    EGFR 41 (L) 09/25/2024    EGFR 33 (L) 09/24/2024    CREATININE 1.43 (H) 11/19/2024    CREATININE 1.24 (H) 09/25/2024    CREATININE 1.51 (H) 09/24/2024     - see adjustments to diuretics  - advised to stay well hydrated  - repeat BMP with follow up in office in 1 week          Relevant Medications    torsemide (DEMADEX) 20 mg tablet    torsemide (DEMADEX) 10 mg tablet       BMI Counseling: Body mass index is 34.95 kg/m².        [employer] software was used to dictate this note.  It may contain errors with dictating incorrect words or incorrect spelling. Please contact the provider directly with any questions.    Subjective:      Patient ID: Isaura Burnett is a 89 y.o. female.    HPI    Patient presents today accompanied by her daughter with worsening bilateral LE edema. She was seen 3 weeks ago and continued on lasix 20mg daily.   Her assisted living called her cardiologist on 11/19 due to a weight gain from 200 to 205 pounds over a few days for which her cardiologist increased her Lasix to 40 mg daily.  In the last few days she has been gaining more weight.  Her weight today is 210 pounds per her assisted living.  She  denies any shortness of breath, cough, chest tightness.   She is followed by [facility] wound care who contacted our office to report the increased lower extremity edema and requested an increase in her Lasix.     Last BMP completed 11/19/2024  Creatinine was elevated 1.43, EGFR 32.   She is not on any potassium supplement, calcium was normal at 4.3.     The following portions of the patient's history were reviewed and updated as appropriate: allergies, current medications, past family history, past medical history, past social history, past surgical history, and problem list.    Review of Systems   Constitutional:  Positive for unexpected weight change. Negative for chills and fever.   Respiratory:  Negative for cough, chest tightness, shortness of breath and wheezing.    Cardiovascular:  Positive for leg swelling.         Past Medical History:   Diagnosis Date    A-fib ([facility])     HERMAN (acute kidney injury) ([facility]) 09/22/2024    CHF (congestive heart failure) ([facility])     COVID-19 virus infection 11/01/2024         Current Outpatient Medications:     acetaminophen (TYLENOL) 325 mg tablet, Take 650 mg by mouth every 4 (four) hours as needed for mild pain or fever, Disp: , Rfl:     ammonium lactate (LAC-HYDRIN) 12 % cream, Apply topically Apply topically to affected areas as needed for dry skin *keep at bedside*, Disp: , Rfl:     apixaban (ELIQUIS) 5 mg, Take 5 mg by mouth 2 (two) times a day, Disp: , Rfl:     atorvastatin (LIPITOR) 40 mg tablet, Take 40 mg by mouth daily, Disp: , Rfl:     fluticasone (FLONASE) 50 mcg/act nasal spray, 1 spray into each nostril 2 (two) times a day, Disp: , Rfl:     gabapentin (NEURONTIN) 300 mg capsule, Take 300 mg by mouth 2 (two) times a day, Disp: , Rfl:     levothyroxine 125 mcg tablet, Take 125 mcg by mouth daily, Disp: , Rfl:     Menthol, Topical Analgesic, (BIOFREEZE ROLL-ON EX), Apply topically 2 (two) times a day, Disp: , Rfl:     menthol-zinc oxide (Calmoseptine) 0.44-20.6 % OINT,  "Apply topically 2 (two) times a day, Disp: 100 g, Rfl: 2    metoprolol succinate (TOPROL-XL) 100 mg 24 hr tablet, Take 100 mg by mouth daily, Disp: , Rfl:     nystatin (MYCOSTATIN) powder, Apply topically 3 (three) times a day, Disp: 60 g, Rfl: 2    Ostomy Supplies (Skin Prep Wipes) MISC, Apply to right heel twice daily, Disp: , Rfl:     pantoprazole (PROTONIX) 40 mg tablet, Take 40 mg by mouth daily, Disp: , Rfl:     polyethylene glycol (GLYCOLAX) 17 GM/SCOOP powder, Take 17 g by mouth every other day Okay to hold for loose stool., Disp: , Rfl:     potassium chloride (MICRO-K) 10 MEQ CR capsule, Take 1 capsule (10 mEq total) by mouth daily, Disp: 30 capsule, Rfl: 5    senna-docusate sodium (SENOKOT-S) 8.6-50 mg per tablet, Take 1 tablet by mouth daily at bedtime, Disp: , Rfl:     solifenacin (VESICARE) 10 MG tablet, Take 1 tablet by mouth daily, Disp: , Rfl:     spironolactone (ALDACTONE) 25 mg tablet, Take 25 mg by mouth daily, Disp: , Rfl:     torsemide (DEMADEX) 10 mg tablet, Take 1 tablet daily every afternoon, Disp: 30 tablet, Rfl: 5    torsemide (DEMADEX) 20 mg tablet, Take 1 tablet (20 mg total) by mouth every morning, Disp: 30 tablet, Rfl: 5    vitamin B-12 (VITAMIN B-12) 1,000 mcg tablet, Take 1,000 mcg by mouth daily, Disp: , Rfl:     No Known Allergies    Social History   Past Surgical History:   Procedure Laterality Date    BLADDER SUSPENSION      CARDIAC PACEMAKER PLACEMENT  02/2024    TVAR 2 weeks prior    CATARACT EXTRACTION      CHOLECYSTECTOMY      FEMUR SURGERY Right 08/2024    THYROIDECTOMY       Family History   Problem Relation Age of Onset    Heart disease Mother     Colon cancer Father        Objective:  /60 (BP Location: Left arm, Patient Position: Sitting, Cuff Size: Adult)   Pulse 61   Temp 98.1 °F (36.7 °C) (Temporal)   Ht 5' 5\" (1.651 m) Comment: verbal  Wt 95.3 kg (210 lb) Comment: weight as per  tech, taken this morning  SpO2 99%   BMI 34.95 kg/m²      Physical " Exam  Vitals reviewed.   Constitutional:       General: She is not in acute distress.     Appearance: Normal appearance. She is obese. She is not diaphoretic.   HENT:      Head: Normocephalic and atraumatic.   Cardiovascular:      Rate and Rhythm: Normal rate and regular rhythm.      Heart sounds: Normal heart sounds.   Pulmonary:      Effort: Pulmonary effort is normal. No respiratory distress.      Breath sounds: Normal breath sounds. No wheezing, rhonchi or rales.   Musculoskeletal:      Right lower leg: Edema present.      Left lower leg: Edema present.   Neurological:      Mental Status: She is alert. Mental status is at baseline.      Gait: Gait abnormal (in wheelchair).   Psychiatric:         Mood and Affect: Mood normal.         Behavior: Behavior normal.

## 2024-12-16 ENCOUNTER — APPOINTMENT (OUTPATIENT)
Dept: LAB | Facility: IMAGING CENTER | Age: 89
End: 2024-12-16
Payer: COMMERCIAL

## 2024-12-16 DIAGNOSIS — I50.32 CHRONIC DIASTOLIC HEART FAILURE (HCC): ICD-10-CM

## 2024-12-16 LAB
ANION GAP SERPL CALCULATED.3IONS-SCNC: 11 MMOL/L (ref 4–13)
BNP SERPL-MCNC: 90 PG/ML (ref 0–100)
BUN SERPL-MCNC: 44 MG/DL (ref 5–25)
CALCIUM SERPL-MCNC: 10.8 MG/DL (ref 8.4–10.2)
CHLORIDE SERPL-SCNC: 99 MMOL/L (ref 96–108)
CO2 SERPL-SCNC: 28 MMOL/L (ref 21–32)
CREAT SERPL-MCNC: 1.66 MG/DL (ref 0.6–1.3)
GFR SERPL CREATININE-BSD FRML MDRD: 26 ML/MIN/1.73SQ M
GLUCOSE P FAST SERPL-MCNC: 126 MG/DL (ref 65–99)
POTASSIUM SERPL-SCNC: 4.1 MMOL/L (ref 3.5–5.3)
SODIUM SERPL-SCNC: 138 MMOL/L (ref 135–147)

## 2024-12-16 PROCEDURE — 80048 BASIC METABOLIC PNL TOTAL CA: CPT

## 2024-12-16 PROCEDURE — 83880 ASSAY OF NATRIURETIC PEPTIDE: CPT

## 2024-12-16 PROCEDURE — 36415 COLL VENOUS BLD VENIPUNCTURE: CPT

## 2024-12-17 ENCOUNTER — RESULTS FOLLOW-UP (OUTPATIENT)
Age: 89
End: 2024-12-17

## 2024-12-19 ENCOUNTER — OFFICE VISIT (OUTPATIENT)
Dept: INTERNAL MEDICINE CLINIC | Facility: OTHER | Age: 89
End: 2024-12-19
Payer: COMMERCIAL

## 2024-12-19 VITALS
WEIGHT: 205.5 LBS | OXYGEN SATURATION: 98 % | TEMPERATURE: 97.6 F | DIASTOLIC BLOOD PRESSURE: 60 MMHG | BODY MASS INDEX: 34.2 KG/M2 | HEART RATE: 70 BPM | SYSTOLIC BLOOD PRESSURE: 124 MMHG

## 2024-12-19 DIAGNOSIS — N18.4 CHRONIC KIDNEY DISEASE, STAGE 4 (SEVERE) (HCC): Chronic | ICD-10-CM

## 2024-12-19 DIAGNOSIS — E11.65 TYPE 2 DIABETES MELLITUS WITH HYPERGLYCEMIA, WITHOUT LONG-TERM CURRENT USE OF INSULIN (HCC): Primary | ICD-10-CM

## 2024-12-19 DIAGNOSIS — I50.32 CHRONIC DIASTOLIC HEART FAILURE (HCC): ICD-10-CM

## 2024-12-19 PROCEDURE — 99214 OFFICE O/P EST MOD 30 MIN: CPT | Performed by: NURSE PRACTITIONER

## 2024-12-19 PROCEDURE — G2211 COMPLEX E/M VISIT ADD ON: HCPCS | Performed by: NURSE PRACTITIONER

## 2024-12-19 NOTE — TELEPHONE ENCOUNTER
phone call received from [facility] just now. There seems to be confusion with Isaura's torsemide. She currently takes 10 mg in the afternoon and 20 mg in morning.Does she still want her to also continue the 10 mg in afternoon?       Can PCP please clarify new orders so I can let the pharmacy know.

## 2024-12-19 NOTE — PROGRESS NOTES
Diabetic Foot Exam    Patient's shoes and socks removed.    Right Foot/Ankle   Right Foot Inspection  Skin Exam: skin normal, skin intact and dry skin. No warmth, no callus, no erythema, no maceration, no abnormal color, no pre-ulcer, no ulcer and no callus.     Toe Exam: swelling.     Sensory   Proprioception: intact  Monofilament testing: intact    Vascular  Capillary refills: < 3 seconds  The right DP pulse is 1+. The right PT pulse is 0.     Left Foot/Ankle  Left Foot Inspection  Skin Exam: skin normal, skin intact and dry skin. No warmth, no erythema, no maceration, normal color, no pre-ulcer, no ulcer and no callus.     Toe Exam: swelling.     Sensory   Proprioception: intact  Monofilament testing: intact    Vascular  Capillary refills: < 3 seconds  The left DP pulse is 1+. The left PT pulse is 0.     Assign Risk Category  No deformity present  No loss of protective sensation  Weak pulses  Risk: 0

## 2024-12-19 NOTE — ASSESSMENT & PLAN NOTE
Wt Readings from Last 3 Encounters:   12/19/24 93.2 kg (205 lb 8 oz)   12/11/24 95.3 kg (210 lb)   11/15/24 109 kg (240 lb)     -5 pound weight loss since starting increased dose of torsemide milligrams in the morning and 10 mg in the afternoon  -BNP normal  -Due to her elevated creatinine we will decrease her torsemide back to 20 mg once daily  -Advised [place] to send us her daily weights in 1 week  -Repeat BMP in 2 weeks

## 2024-12-19 NOTE — PROGRESS NOTES
Assessment/Plan:    Problem List Items Addressed This Visit       Chronic diastolic heart failure (HCC)    Wt Readings from Last 3 Encounters:   12/19/24 93.2 kg (205 lb 8 oz)   12/11/24 95.3 kg (210 lb)   11/15/24 109 kg (240 lb)     -5 pound weight loss since starting increased dose of torsemide milligrams in the morning and 10 mg in the afternoon  -BNP normal  -Due to her elevated creatinine we will decrease her torsemide back to 20 mg once daily  -Advised [place] to send us her daily weights in 1 week  -Repeat BMP in 2 weeks               Chronic kidney disease, stage 4 (severe) (HCC) (Chronic)    Lab Results   Component Value Date    EGFR 26 12/16/2024    EGFR 32 11/19/2024    EGFR 41 (L) 09/25/2024    CREATININE 1.66 (H) 12/16/2024    CREATININE 1.43 (H) 11/19/2024    CREATININE 1.24 (H) 09/25/2024     -Rising creatinine with increase to torsemide  -Decrease torsemide to 20 mg once daily  -Advised to stay well-hydrated  -Repeat BMP in 2 weeks         Relevant Orders    Basic metabolic panel    Type 2 diabetes mellitus with hyperglycemia, without long-term current use of insulin ([facility]) - Primary    Relevant Orders    Ambulatory Referral to Ophthalmology            M*Modal software was used to dictate this note.  It may contain errors with dictating incorrect words or incorrect spelling. Please contact the provider directly with any questions.    Subjective:      Patient ID: Isaura Burnett is a 89 y.o. female.    HPI    Patient presents today for 1 week follow up increased LE edema. She was started on toresemide 20mg daily in the morning and 10mg in the afternoon with potassium 10meq.   Her weight is down 5 lbs from last week   Follow up BMP shows a bump in her creatinine from 1.43 last week to 1.66 this week. eGFR 26, previously 32.     The following portions of the patient's history were reviewed and updated as appropriate: allergies, current medications, past family history, past medical history, past  social history, past surgical history, and problem list.    Review of Systems   Constitutional:  Negative for activity change, appetite change and unexpected weight change.   Respiratory:  Negative for cough, chest tightness and shortness of breath.    Cardiovascular:  Positive for leg swelling (improving). Negative for chest pain.         Past Medical History:   Diagnosis Date    A-fib (HCC)     HREMAN (acute kidney injury) (HCC) 09/22/2024    CHF (congestive heart failure) (HCC)     COVID-19 virus infection 11/01/2024         Current Outpatient Medications:     acetaminophen (TYLENOL) 325 mg tablet, Take 650 mg by mouth every 4 (four) hours as needed for mild pain or fever, Disp: , Rfl:     ammonium lactate (LAC-HYDRIN) 12 % cream, Apply topically Apply topically to affected areas as needed for dry skin *keep at bedside*, Disp: , Rfl:     apixaban (ELIQUIS) 5 mg, Take 5 mg by mouth 2 (two) times a day, Disp: , Rfl:     atorvastatin (LIPITOR) 40 mg tablet, Take 40 mg by mouth daily, Disp: , Rfl:     fluticasone (FLONASE) 50 mcg/act nasal spray, 1 spray into each nostril 2 (two) times a day, Disp: , Rfl:     gabapentin (NEURONTIN) 300 mg capsule, Take 300 mg by mouth 2 (two) times a day, Disp: , Rfl:     levothyroxine 125 mcg tablet, Take 125 mcg by mouth daily, Disp: , Rfl:     metoprolol succinate (TOPROL-XL) 100 mg 24 hr tablet, Take 100 mg by mouth daily, Disp: , Rfl:     nystatin (MYCOSTATIN) powder, Apply topically 3 (three) times a day, Disp: 60 g, Rfl: 2    pantoprazole (PROTONIX) 40 mg tablet, Take 40 mg by mouth daily, Disp: , Rfl:     polyethylene glycol (GLYCOLAX) 17 GM/SCOOP powder, Take 17 g by mouth every other day Okay to hold for loose stool., Disp: , Rfl:     senna-docusate sodium (SENOKOT-S) 8.6-50 mg per tablet, Take 1 tablet by mouth daily at bedtime, Disp: , Rfl:     solifenacin (VESICARE) 10 MG tablet, Take 1 tablet by mouth daily, Disp: , Rfl:     spironolactone (ALDACTONE) 25 mg tablet, Take 25  mg by mouth daily, Disp: , Rfl:     torsemide (DEMADEX) 20 mg tablet, Take 1 tablet (20 mg total) by mouth every morning, Disp: 30 tablet, Rfl: 5    vitamin B-12 (VITAMIN B-12) 1,000 mcg tablet, Take 1,000 mcg by mouth daily, Disp: , Rfl:     Menthol, Topical Analgesic, (BIOFREEZE ROLL-ON EX), Apply topically 2 (two) times a day, Disp: , Rfl:     menthol-zinc oxide (Calmoseptine) 0.44-20.6 % OINT, Apply topically 2 (two) times a day, Disp: 100 g, Rfl: 2    Ostomy Supplies (Skin Prep Wipes) MISC, Apply to right heel twice daily, Disp: , Rfl:     No Known Allergies    Social History   Past Surgical History:   Procedure Laterality Date    BLADDER SUSPENSION      CARDIAC PACEMAKER PLACEMENT  02/2024    TVAR 2 weeks prior    CATARACT EXTRACTION      CHOLECYSTECTOMY      FEMUR SURGERY Right 08/2024    THYROIDECTOMY       Family History   Problem Relation Age of Onset    Heart disease Mother     Colon cancer Father        Objective:  /60 (BP Location: Left arm, Patient Position: Sitting, Cuff Size: Standard)   Pulse 70   Temp 97.6 °F (36.4 °C) (Temporal)   Wt 93.2 kg (205 lb 8 oz) Comment: weight taking at [facility]  SpO2 98%   BMI 34.20 kg/m²      Physical Exam  Vitals reviewed.   Constitutional:       General: She is not in acute distress.     Appearance: Normal appearance. She is obese. She is not diaphoretic.   HENT:      Head: Normocephalic and atraumatic.   Cardiovascular:      Rate and Rhythm: Normal rate and regular rhythm.   Pulmonary:      Effort: Pulmonary effort is normal. No respiratory distress.      Breath sounds: Normal breath sounds. No wheezing, rhonchi or rales.   Musculoskeletal:      Right lower leg: Edema present.      Left lower leg: Edema present.   Neurological:      Mental Status: She is alert. Mental status is at baseline.      Gait: Gait abnormal.   Psychiatric:         Mood and Affect: Mood normal.         Behavior: Behavior normal.

## 2024-12-19 NOTE — TELEPHONE ENCOUNTER
I discontinued her torsemide 10 mg in the afternoon  She is to continue her torsemide 20 mg in the morning

## 2024-12-26 DIAGNOSIS — I10 ESSENTIAL HYPERTENSION: Primary | ICD-10-CM

## 2024-12-26 RX ORDER — SPIRONOLACTONE 25 MG/1
25 TABLET ORAL DAILY
Qty: 30 TABLET | Refills: 0 | Status: SHIPPED | OUTPATIENT
Start: 2024-12-26 | End: 2025-12-26

## 2024-12-26 NOTE — TELEPHONE ENCOUNTER
Please advise , medication from another provider. Patient is completely out of medication and PCP is out of office.     [facility] - [place], [place] - [address]     NOV: 2/12/25

## 2025-01-01 ENCOUNTER — RESULTS FOLLOW-UP (OUTPATIENT)
Dept: INTERNAL MEDICINE CLINIC | Facility: OTHER | Age: OVER 89
End: 2025-01-01

## 2025-01-01 ENCOUNTER — TELEPHONE (OUTPATIENT)
Dept: INTERNAL MEDICINE CLINIC | Facility: OTHER | Age: OVER 89
End: 2025-01-01

## 2025-01-01 RX ORDER — POLYETHYLENE GLYCOL 3350 17 G/17G
17 POWDER, FOR SOLUTION ORAL DAILY PRN
Status: SHIPPED
Start: 2025-01-01 | End: 2025-04-16

## 2025-01-02 ENCOUNTER — OFFICE VISIT (OUTPATIENT)
Dept: INTERNAL MEDICINE CLINIC | Facility: OTHER | Age: OVER 89
End: 2025-01-02
Payer: COMMERCIAL

## 2025-01-02 ENCOUNTER — TELEPHONE (OUTPATIENT)
Age: OVER 89
End: 2025-01-02

## 2025-01-02 ENCOUNTER — APPOINTMENT (OUTPATIENT)
Dept: LAB | Facility: IMAGING CENTER | Age: OVER 89
End: 2025-01-02
Payer: COMMERCIAL

## 2025-01-02 VITALS
DIASTOLIC BLOOD PRESSURE: 60 MMHG | HEART RATE: 70 BPM | OXYGEN SATURATION: 98 % | HEIGHT: 65 IN | SYSTOLIC BLOOD PRESSURE: 122 MMHG | BODY MASS INDEX: 34.2 KG/M2 | TEMPERATURE: 97.9 F

## 2025-01-02 DIAGNOSIS — I48.92 ATRIAL FLUTTER, UNSPECIFIED TYPE (HCC): ICD-10-CM

## 2025-01-02 DIAGNOSIS — I73.9 PVD (PERIPHERAL VASCULAR DISEASE) (HCC): ICD-10-CM

## 2025-01-02 DIAGNOSIS — N18.4 CHRONIC KIDNEY DISEASE, STAGE 4 (SEVERE) (HCC): Chronic | ICD-10-CM

## 2025-01-02 DIAGNOSIS — E11.65 TYPE 2 DIABETES MELLITUS WITH HYPERGLYCEMIA, WITHOUT LONG-TERM CURRENT USE OF INSULIN (HCC): ICD-10-CM

## 2025-01-02 DIAGNOSIS — I50.32 CHRONIC DIASTOLIC HEART FAILURE (HCC): ICD-10-CM

## 2025-01-02 DIAGNOSIS — N18.4 CHRONIC KIDNEY DISEASE, STAGE 4 (SEVERE) (HCC): ICD-10-CM

## 2025-01-02 DIAGNOSIS — L03.115 CELLULITIS OF RIGHT LOWER EXTREMITY: ICD-10-CM

## 2025-01-02 DIAGNOSIS — L03.115 CELLULITIS OF RIGHT LOWER EXTREMITY: Primary | ICD-10-CM

## 2025-01-02 DIAGNOSIS — E66.01 OBESITY, MORBID (HCC): ICD-10-CM

## 2025-01-02 DIAGNOSIS — E21.3 HYPERPARATHYROIDISM (HCC): ICD-10-CM

## 2025-01-02 PROBLEM — Z76.89 ESTABLISHING CARE WITH NEW DOCTOR, ENCOUNTER FOR: Status: RESOLVED | Noted: 2022-08-19 | Resolved: 2025-01-02

## 2025-01-02 PROBLEM — S72.91XA FEMUR FRACTURE, RIGHT (HCC): Status: RESOLVED | Noted: 2024-08-20 | Resolved: 2025-01-02

## 2025-01-02 LAB
ANION GAP SERPL CALCULATED.3IONS-SCNC: 10 MMOL/L (ref 4–13)
BUN SERPL-MCNC: 41 MG/DL (ref 5–25)
CALCIUM SERPL-MCNC: 10 MG/DL (ref 8.4–10.2)
CHLORIDE SERPL-SCNC: 102 MMOL/L (ref 96–108)
CO2 SERPL-SCNC: 25 MMOL/L (ref 21–32)
CREAT SERPL-MCNC: 1.64 MG/DL (ref 0.6–1.3)
GFR SERPL CREATININE-BSD FRML MDRD: 27 ML/MIN/1.73SQ M
GLUCOSE SERPL-MCNC: 198 MG/DL (ref 65–140)
POTASSIUM SERPL-SCNC: 4.1 MMOL/L (ref 3.5–5.3)
SODIUM SERPL-SCNC: 137 MMOL/L (ref 135–147)

## 2025-01-02 PROCEDURE — G2211 COMPLEX E/M VISIT ADD ON: HCPCS | Performed by: NURSE PRACTITIONER

## 2025-01-02 PROCEDURE — 80048 BASIC METABOLIC PNL TOTAL CA: CPT

## 2025-01-02 PROCEDURE — 36415 COLL VENOUS BLD VENIPUNCTURE: CPT

## 2025-01-02 PROCEDURE — 99213 OFFICE O/P EST LOW 20 MIN: CPT | Performed by: NURSE PRACTITIONER

## 2025-01-02 RX ORDER — CEPHALEXIN 500 MG/1
500 CAPSULE ORAL EVERY 8 HOURS SCHEDULED
Qty: 21 CAPSULE | Refills: 0 | Status: SHIPPED | OUTPATIENT
Start: 2025-01-02 | End: 2025-01-09

## 2025-01-02 RX ORDER — CEPHALEXIN 500 MG/1
500 CAPSULE ORAL 2 TIMES DAILY
Qty: 14 CAPSULE | Refills: 0 | Status: SHIPPED | OUTPATIENT
Start: 2025-01-02 | End: 2025-01-02 | Stop reason: SDUPTHER

## 2025-01-02 NOTE — PROGRESS NOTES
Assessment/Plan:    Problem List Items Addressed This Visit       Atrial flutter (HCC)    Follows with [facility] cardiology  Managed on Eliquis 5 mg twice daily metoprolol  mg daily         Chronic diastolic heart failure (HCC)    Wt Readings from Last 3 Encounters:   12/19/24 93.2 kg (205 lb 8 oz)   12/11/24 95.3 kg (210 lb)   11/15/24 109 kg (240 lb)     -follows with cardiology  -continue torsemide 20mg daily   -continue daily weights  -repeat BMP today                Chronic kidney disease, stage 4 (severe) (HCC) (Chronic)    Lab Results   Component Value Date    EGFR 26 12/16/2024    EGFR 32 11/19/2024    EGFR 41 (L) 09/25/2024    CREATININE 1.66 (H) 12/16/2024    CREATININE 1.43 (H) 11/19/2024    CREATININE 1.24 (H) 09/25/2024     - repeat BMP today   - Cr Cl 33. Keflex renally dosed accordingly          Hyperparathyroidism (HCC)    Follows with nephrology           RESOLVED: Obesity, morbid (HCC)    PVD (peripheral vascular disease) (HCC)    Continue atorvastatin 40 mg daily         Type 2 diabetes mellitus with hyperglycemia, without long-term current use of insulin (HCC)      Lab Results   Component Value Date    HGBA1C 6.5 (H) 09/04/2024     Diet controlled         Cellulitis of right lower extremity - Primary    Start keflex 500mg tid x 7 days   Follow up with office if not resolved after 7 days   Notify office immediately for any worsening symptoms  Continue wound care with [facility]            Relevant Medications    cephalexin (KEFLEX) 500 mg capsule              M*Modal software was used to dictate this note.  It may contain errors with dictating incorrect words or incorrect spelling. Please contact the provider directly with any questions.    Subjective:      Patient ID: Isaura Burnett is a 89 y.o. female.    HPI    Patient presents today from [facility] with concern for a rash on her right LE for a few weeks. She states it has been red for many weeks. She denies any  pain. She has occasional itching. She is seeing [facility] for wound care of a chronic RLE wound. She denies any fever or chills. Feels her LE edema is at baseline.     The following portions of the patient's history were reviewed and updated as appropriate: allergies, current medications, past family history, past medical history, past social history, past surgical history, and problem list.    Review of Systems   Constitutional:  Negative for chills and fever.   Respiratory:  Negative for shortness of breath.    Skin:  Positive for color change and wound. Negative for rash.         Past Medical History:   Diagnosis Date    A-fib ([facility])     HERMAN (acute kidney injury) ([facility]) 09/22/2024    CHF (congestive heart failure) ([facility])     COVID-19 virus infection 11/01/2024    Femur fracture, right ([facility]) 08/20/2024    Obesity, morbid ([facility]) 06/28/2018         Current Outpatient Medications:     acetaminophen (TYLENOL) 325 mg tablet, Take 650 mg by mouth every 4 (four) hours as needed for mild pain or fever, Disp: , Rfl:     ammonium lactate (LAC-HYDRIN) 12 % cream, Apply topically Apply topically to affected areas as needed for dry skin *keep at bedside*, Disp: , Rfl:     apixaban (ELIQUIS) 5 mg, Take 5 mg by mouth 2 (two) times a day, Disp: , Rfl:     atorvastatin (LIPITOR) 40 mg tablet, Take 40 mg by mouth daily, Disp: , Rfl:     cephalexin (KEFLEX) 500 mg capsule, Take 1 capsule (500 mg total) by mouth 2 (two) times a day for 7 days, Disp: 14 capsule, Rfl: 0    fluticasone (FLONASE) 50 mcg/act nasal spray, 1 spray into each nostril 2 (two) times a day, Disp: , Rfl:     gabapentin (NEURONTIN) 300 mg capsule, Take 300 mg by mouth 2 (two) times a day, Disp: , Rfl:     levothyroxine 125 mcg tablet, Take 125 mcg by mouth daily, Disp: , Rfl:     Menthol, Topical Analgesic, (BIOFREEZE ROLL-ON EX), Apply topically 2 (two) times a day, Disp: , Rfl:     menthol-zinc oxide (Calmoseptine) 0.44-20.6 % OINT, Apply topically 2  "(two) times a day, Disp: 100 g, Rfl: 2    metoprolol succinate (TOPROL-XL) 100 mg 24 hr tablet, Take 100 mg by mouth daily, Disp: , Rfl:     nystatin (MYCOSTATIN) powder, Apply topically 3 (three) times a day, Disp: 60 g, Rfl: 2    Ostomy Supplies (Skin Prep Wipes) MISC, Apply to right heel twice daily, Disp: , Rfl:     pantoprazole (PROTONIX) 40 mg tablet, Take 40 mg by mouth daily, Disp: , Rfl:     polyethylene glycol (GLYCOLAX) 17 GM/SCOOP powder, Take 17 g by mouth every other day Okay to hold for loose stool., Disp: , Rfl:     senna-docusate sodium (SENOKOT-S) 8.6-50 mg per tablet, Take 1 tablet by mouth daily at bedtime, Disp: , Rfl:     solifenacin (VESICARE) 10 MG tablet, Take 1 tablet by mouth daily, Disp: , Rfl:     spironolactone (ALDACTONE) 25 mg tablet, Take 1 tablet (25 mg total) by mouth daily, Disp: 30 tablet, Rfl: 0    torsemide (DEMADEX) 20 mg tablet, Take 1 tablet (20 mg total) by mouth every morning, Disp: 30 tablet, Rfl: 5    vitamin B-12 (VITAMIN B-12) 1,000 mcg tablet, Take 1,000 mcg by mouth daily, Disp: , Rfl:     No Known Allergies    Social History   Past Surgical History:   Procedure Laterality Date    BLADDER SUSPENSION      CARDIAC PACEMAKER PLACEMENT  02/2024    TVAR 2 weeks prior    CATARACT EXTRACTION      CHOLECYSTECTOMY      FEMUR SURGERY Right 08/2024    THYROIDECTOMY       Family History   Problem Relation Age of Onset    Heart disease Mother     Colon cancer Father        Objective:  /60 (BP Location: Left arm, Patient Position: Sitting, Cuff Size: Large)   Pulse 70   Temp 97.9 °F (36.6 °C) (Temporal)   Ht 5' 5\" (1.651 m)   SpO2 98%   BMI 34.20 kg/m²      Physical Exam  Vitals reviewed.   Constitutional:       General: She is not in acute distress.     Appearance: Normal appearance. She is obese. She is not diaphoretic.   HENT:      Head: Normocephalic and atraumatic.   Cardiovascular:      Rate and Rhythm: Normal rate and regular rhythm.      Heart sounds: Normal " heart sounds.   Pulmonary:      Effort: Pulmonary effort is normal. No respiratory distress.      Breath sounds: Normal breath sounds. No wheezing, rhonchi or rales.   Musculoskeletal:      Right lower leg: Edema present.      Left lower leg: Edema present.   Skin:     Findings: Erythema (RLE, see photo) present.   Neurological:      Mental Status: She is alert. Mental status is at baseline.      Gait: Gait abnormal.   Psychiatric:         Mood and Affect: Mood normal.         Behavior: Behavior normal.

## 2025-01-02 NOTE — ASSESSMENT & PLAN NOTE
Wt Readings from Last 3 Encounters:   12/19/24 93.2 kg (205 lb 8 oz)   12/11/24 95.3 kg (210 lb)   11/15/24 109 kg (240 lb)     -follows with cardiology  -continue torsemide 20mg daily   -continue daily weights  -repeat BMP today

## 2025-01-02 NOTE — TELEPHONE ENCOUNTER
Keflex order sent to pharmacy states Take 1 capsule (500 mg total) by mouth 2 (two) times a day for 7 days, order sheet sent to [facility] facility stated 3x/day.     Please call to clarify order.   Parent

## 2025-01-02 NOTE — ASSESSMENT & PLAN NOTE
Start keflex 500mg tid x 7 days   Follow up with office if not resolved after 7 days   Notify office immediately for any worsening symptoms  Continue wound care with [facility]

## 2025-01-02 NOTE — ASSESSMENT & PLAN NOTE
Follows with [facility] cardiology  Managed on Eliquis 5 mg twice daily metoprolol  mg daily

## 2025-01-02 NOTE — ASSESSMENT & PLAN NOTE
Lab Results   Component Value Date    EGFR 26 12/16/2024    EGFR 32 11/19/2024    EGFR 41 (L) 09/25/2024    CREATININE 1.66 (H) 12/16/2024    CREATININE 1.43 (H) 11/19/2024    CREATININE 1.24 (H) 09/25/2024     - repeat BMP today   - Cr Cl 33. Keflex renally dosed accordingly

## 2025-01-09 ENCOUNTER — RESULTS FOLLOW-UP (OUTPATIENT)
Dept: INTERNAL MEDICINE CLINIC | Facility: OTHER | Age: OVER 89
End: 2025-01-09

## 2025-01-15 ENCOUNTER — TELEPHONE (OUTPATIENT)
Dept: INTERNAL MEDICINE CLINIC | Facility: OTHER | Age: OVER 89
End: 2025-01-15

## 2025-01-15 NOTE — TELEPHONE ENCOUNTER
Just an FYI([facility] patient) - resident had a cardiology appointment this morning and was transferred straight to the hospital. Per daughter it was [facility], receiving IV diuresis.

## 2025-02-01 ENCOUNTER — APPOINTMENT (OUTPATIENT)
Dept: LAB | Facility: IMAGING CENTER | Age: OVER 89
End: 2025-02-01
Payer: COMMERCIAL

## 2025-02-01 DIAGNOSIS — E21.3 HYPERPARATHYROIDISM (HCC): ICD-10-CM

## 2025-02-01 DIAGNOSIS — N18.4 CHRONIC KIDNEY DISEASE, STAGE 4 (SEVERE) (HCC): Chronic | ICD-10-CM

## 2025-02-01 DIAGNOSIS — R82.90 ABNORMAL URINE ODOR: ICD-10-CM

## 2025-02-01 DIAGNOSIS — E11.65 TYPE 2 DIABETES MELLITUS WITH HYPERGLYCEMIA, WITHOUT LONG-TERM CURRENT USE OF INSULIN (HCC): ICD-10-CM

## 2025-02-01 DIAGNOSIS — E55.9 VITAMIN D DEFICIENCY: ICD-10-CM

## 2025-02-01 DIAGNOSIS — E89.0 POSTOPERATIVE HYPOTHYROIDISM: ICD-10-CM

## 2025-02-01 DIAGNOSIS — I25.10 ARTERIOSCLEROSIS OF CORONARY ARTERY: ICD-10-CM

## 2025-02-01 LAB
25(OH)D3 SERPL-MCNC: 48.9 NG/ML (ref 30–100)
ALBUMIN SERPL BCG-MCNC: 3.7 G/DL (ref 3.5–5)
ALP SERPL-CCNC: 72 U/L (ref 34–104)
ALT SERPL W P-5'-P-CCNC: 11 U/L (ref 7–52)
ANION GAP SERPL CALCULATED.3IONS-SCNC: 10 MMOL/L (ref 4–13)
AST SERPL W P-5'-P-CCNC: 19 U/L (ref 13–39)
BASOPHILS # BLD AUTO: 0.07 THOUSANDS/ΜL (ref 0–0.1)
BASOPHILS NFR BLD AUTO: 1 % (ref 0–1)
BILIRUB SERPL-MCNC: 1.1 MG/DL (ref 0.2–1)
BUN SERPL-MCNC: 50 MG/DL (ref 5–25)
CALCIUM SERPL-MCNC: 10.4 MG/DL (ref 8.4–10.2)
CHLORIDE SERPL-SCNC: 95 MMOL/L (ref 96–108)
CHOLEST SERPL-MCNC: 92 MG/DL (ref ?–200)
CO2 SERPL-SCNC: 29 MMOL/L (ref 21–32)
CREAT SERPL-MCNC: 1.61 MG/DL (ref 0.6–1.3)
EOSINOPHIL # BLD AUTO: 0.24 THOUSAND/ΜL (ref 0–0.61)
EOSINOPHIL NFR BLD AUTO: 3 % (ref 0–6)
ERYTHROCYTE [DISTWIDTH] IN BLOOD BY AUTOMATED COUNT: 14.2 % (ref 11.6–15.1)
EST. AVERAGE GLUCOSE BLD GHB EST-MCNC: 143 MG/DL
GFR SERPL CREATININE-BSD FRML MDRD: 27 ML/MIN/1.73SQ M
GLUCOSE P FAST SERPL-MCNC: 132 MG/DL (ref 65–99)
HBA1C MFR BLD: 6.6 %
HCT VFR BLD AUTO: 36.3 % (ref 34.8–46.1)
HDLC SERPL-MCNC: 32 MG/DL
HGB BLD-MCNC: 11.9 G/DL (ref 11.5–15.4)
IMM GRANULOCYTES # BLD AUTO: 0.04 THOUSAND/UL (ref 0–0.2)
IMM GRANULOCYTES NFR BLD AUTO: 1 % (ref 0–2)
LDLC SERPL CALC-MCNC: 37 MG/DL (ref 0–100)
LYMPHOCYTES # BLD AUTO: 2.14 THOUSANDS/ΜL (ref 0.6–4.47)
LYMPHOCYTES NFR BLD AUTO: 27 % (ref 14–44)
MCH RBC QN AUTO: 32.8 PG (ref 26.8–34.3)
MCHC RBC AUTO-ENTMCNC: 32.8 G/DL (ref 31.4–37.4)
MCV RBC AUTO: 100 FL (ref 82–98)
MONOCYTES # BLD AUTO: 0.82 THOUSAND/ΜL (ref 0.17–1.22)
MONOCYTES NFR BLD AUTO: 10 % (ref 4–12)
NEUTROPHILS # BLD AUTO: 4.68 THOUSANDS/ΜL (ref 1.85–7.62)
NEUTS SEG NFR BLD AUTO: 58 % (ref 43–75)
NRBC BLD AUTO-RTO: 0 /100 WBCS
PLATELET # BLD AUTO: 291 THOUSANDS/UL (ref 149–390)
PMV BLD AUTO: 10.1 FL (ref 8.9–12.7)
POTASSIUM SERPL-SCNC: 4.6 MMOL/L (ref 3.5–5.3)
PROT SERPL-MCNC: 7.2 G/DL (ref 6.4–8.4)
PTH-INTACT SERPL-MCNC: 54.3 PG/ML (ref 12–88)
RBC # BLD AUTO: 3.63 MILLION/UL (ref 3.81–5.12)
SODIUM SERPL-SCNC: 134 MMOL/L (ref 135–147)
TRIGL SERPL-MCNC: 117 MG/DL (ref ?–150)
TSH SERPL DL<=0.05 MIU/L-ACNC: 1.82 UIU/ML (ref 0.45–4.5)
WBC # BLD AUTO: 7.99 THOUSAND/UL (ref 4.31–10.16)

## 2025-02-01 PROCEDURE — 82306 VITAMIN D 25 HYDROXY: CPT

## 2025-02-01 PROCEDURE — 80053 COMPREHEN METABOLIC PANEL: CPT

## 2025-02-01 PROCEDURE — 80061 LIPID PANEL: CPT

## 2025-02-01 PROCEDURE — 84443 ASSAY THYROID STIM HORMONE: CPT

## 2025-02-01 PROCEDURE — 36415 COLL VENOUS BLD VENIPUNCTURE: CPT

## 2025-02-01 PROCEDURE — 83970 ASSAY OF PARATHORMONE: CPT

## 2025-02-01 PROCEDURE — 83036 HEMOGLOBIN GLYCOSYLATED A1C: CPT

## 2025-02-01 PROCEDURE — 85025 COMPLETE CBC W/AUTO DIFF WBC: CPT

## 2025-02-03 LAB
LEFT EYE DIABETIC RETINOPATHY: NORMAL
RIGHT EYE DIABETIC RETINOPATHY: NORMAL
SEVERITY (EYE EXAM): NORMAL

## 2025-02-04 ENCOUNTER — TELEPHONE (OUTPATIENT)
Dept: INTERNAL MEDICINE CLINIC | Facility: OTHER | Age: OVER 89
End: 2025-02-04

## 2025-02-04 ENCOUNTER — RA CDI HCC (OUTPATIENT)
Dept: OTHER | Facility: HOSPITAL | Age: OVER 89
End: 2025-02-04

## 2025-02-04 DIAGNOSIS — I13.0 HYPERTENSIVE HEART AND CHRONIC KIDNEY DISEASE WITH HEART FAILURE (HCC): Primary | ICD-10-CM

## 2025-02-04 DIAGNOSIS — R52 PAIN: Primary | ICD-10-CM

## 2025-02-04 NOTE — TELEPHONE ENCOUNTER
Isaura Burnett is requesting that her SKIN PREP and BIOFREEZE be changed to PRN - does not want these routinely anymore.

## 2025-02-05 RX ORDER — MENTHOL 40 MG/ML
1 GEL TOPICAL 2 TIMES DAILY PRN
Start: 2025-02-05

## 2025-02-05 NOTE — TELEPHONE ENCOUNTER
I changed her biofreeze to prn. She was recently hospitalized last month and I have not seen her since. I would recommend continuing skin prep as she has a hx of wounds. If nursing does not think it is necessary for her we can change it, but I would like to speak with someone first. She did not have any hosp follow up with us to review the hospitalization

## 2025-02-05 NOTE — TELEPHONE ENCOUNTER
[place] has been notified and also added to patients appointment note to discuss skin prep at her appointment.

## 2025-02-12 ENCOUNTER — OFFICE VISIT (OUTPATIENT)
Dept: INTERNAL MEDICINE CLINIC | Facility: OTHER | Age: OVER 89
End: 2025-02-12
Payer: COMMERCIAL

## 2025-02-12 ENCOUNTER — APPOINTMENT (OUTPATIENT)
Dept: LAB | Facility: IMAGING CENTER | Age: OVER 89
End: 2025-02-12
Payer: COMMERCIAL

## 2025-02-12 VITALS
DIASTOLIC BLOOD PRESSURE: 84 MMHG | HEIGHT: 65 IN | SYSTOLIC BLOOD PRESSURE: 110 MMHG | HEART RATE: 74 BPM | BODY MASS INDEX: 35.65 KG/M2 | OXYGEN SATURATION: 97 % | TEMPERATURE: 96.2 F | WEIGHT: 214 LBS

## 2025-02-12 DIAGNOSIS — I50.32 CHRONIC DIASTOLIC HEART FAILURE (HCC): ICD-10-CM

## 2025-02-12 DIAGNOSIS — I50.32 CHRONIC DIASTOLIC HEART FAILURE (HCC): Primary | ICD-10-CM

## 2025-02-12 DIAGNOSIS — I48.92 ATRIAL FLUTTER, UNSPECIFIED TYPE (HCC): ICD-10-CM

## 2025-02-12 DIAGNOSIS — D64.9 ANEMIA, UNSPECIFIED TYPE: ICD-10-CM

## 2025-02-12 DIAGNOSIS — R26.2 AMBULATORY DYSFUNCTION: ICD-10-CM

## 2025-02-12 DIAGNOSIS — I73.9 PVD (PERIPHERAL VASCULAR DISEASE) (HCC): ICD-10-CM

## 2025-02-12 DIAGNOSIS — E21.3 HYPERPARATHYROIDISM (HCC): ICD-10-CM

## 2025-02-12 DIAGNOSIS — N18.4 CHRONIC KIDNEY DISEASE, STAGE 4 (SEVERE) (HCC): Chronic | ICD-10-CM

## 2025-02-12 DIAGNOSIS — N18.4 TYPE 2 DIABETES MELLITUS WITH STAGE 4 CHRONIC KIDNEY DISEASE, WITHOUT LONG-TERM CURRENT USE OF INSULIN (HCC): ICD-10-CM

## 2025-02-12 DIAGNOSIS — E11.22 TYPE 2 DIABETES MELLITUS WITH STAGE 4 CHRONIC KIDNEY DISEASE, WITHOUT LONG-TERM CURRENT USE OF INSULIN (HCC): ICD-10-CM

## 2025-02-12 DIAGNOSIS — Z00.00 ENCOUNTER FOR SUBSEQUENT ANNUAL WELLNESS VISIT (AWV) IN MEDICARE PATIENT: ICD-10-CM

## 2025-02-12 PROBLEM — L03.115 CELLULITIS OF RIGHT LOWER EXTREMITY: Status: RESOLVED | Noted: 2025-01-02 | Resolved: 2025-02-12

## 2025-02-12 PROCEDURE — G0439 PPPS, SUBSEQ VISIT: HCPCS | Performed by: NURSE PRACTITIONER

## 2025-02-12 PROCEDURE — 80048 BASIC METABOLIC PNL TOTAL CA: CPT

## 2025-02-12 PROCEDURE — 99214 OFFICE O/P EST MOD 30 MIN: CPT | Performed by: NURSE PRACTITIONER

## 2025-02-12 PROCEDURE — 36415 COLL VENOUS BLD VENIPUNCTURE: CPT

## 2025-02-12 RX ORDER — TORSEMIDE 20 MG/1
40 TABLET ORAL 2 TIMES DAILY
Qty: 120 TABLET | Refills: 5 | Status: SHIPPED | OUTPATIENT
Start: 2025-02-12

## 2025-02-12 NOTE — PATIENT INSTRUCTIONS

## 2025-02-12 NOTE — PROGRESS NOTES
Name: Isaura Burnett      : 1934      MRN: [id number]  Encounter Provider: KARINA Alonzo  Encounter Date: 2025   Encounter department: [facility] PRIMARY C[facility] [place]    Assessment & Plan  Chronic diastolic heart failure (HCC)  Wt Readings from Last 3 Encounters:   25 97.1 kg (214 lb)   24 93.2 kg (205 lb 8 oz)   24 95.3 kg (210 lb)     Following with LV cardiology  Recently hospitalized in January requiring IV diuresis  restart torsemide 40mg bid (weight trending up)  Lungs clear on exam  Continue daily weights  Check BMP today  Strict low sodium diet < 2000mg daily   Scheduled for echo   Follow up with cardiology as scheduled           Orders:    Basic metabolic panel; Future    torsemide (DEMADEX) 20 mg tablet; Take 2 tablets (40 mg total) by mouth 2 (two) times a day    Type 2 diabetes mellitus with stage 4 chronic kidney disease, without long-term current use of insulin ([facility])    Lab Results   Component Value Date    HGBA1C 6.6 (H) 2025     Diet controlled        Atrial flutter, unspecified type ([facility])  Follows with cardiology  Continue eliquis 2.5mg bid and Toprol XL 100mg daily        PVD (peripheral vascular disease) ([facility])  Continue atorvastatin 40mg daily        Hyperparathyroidism ([facility])  PTH normal   Continue to monitor annually        Chronic kidney disease, stage 4 (severe) ([facility])  Lab Results   Component Value Date    EGFR 27 2025    EGFR 27 (L) 2025    EGFR 27 (L) 2025    CREATININE 1.61 (H) 2025    CREATININE 1.75 (H) 2025    CREATININE 1.79 (H) 2025     Check BMP today  Monitor closely with increased diuretics   Follows with nephrology, follow up scheduled in March        Anemia, unspecified type  Hgb normal 11.9  Continue to trend        Ambulatory dysfunction  Using wheelchair        Encounter for subsequent annual wellness visit (AWV) in Medicare patient  Advised to bring copy of ACP  documents to office  Up to date on flu shot  Daughter will check on covid vaccine           Preventive health issues were discussed with patient, and age appropriate screening tests were ordered as noted in patient's After Visit Summary. Personalized health advice and appropriate referrals for health education or preventive services given if needed, as noted in patient's After Visit Summary.    History of Present Illness     HPI   Patient presents today accompanied by her daughter for AWV and routine follow-up  He was recently hospitalized in January with acute on chronic diastolic congestive heart failure.  He was sent from her cardiologist office for direct admission for IV diuresis.  Her baseline weight is 207 to 210 pounds but at the time her weight was 221 pounds.  She was given IV Lasix 80 mg twice daily.  In her hospitalization her Eliquis was cut down to 2.5 mg twice daily.  Her levothyroxine was adjusted to 125 mcg daily.  Discharged home on torsemide 40 mg daily.  Recently followed up with her cardiologist again on 2/7.  She was advised to continue torsemide 40 mg twice daily for 5 days and then resume back to 40 mg daily starting to 2/11.  She is due for an echo.  Advised to go for blood work this week.  She is to be on a strict sodium restricted diet less than 2000 mg of sodium daily.   On review of her daily weights from [facility], her weight has been trending up since 2/4.   2/4- 208 2/5- 210 - called cards, was advised to increase torsemide to 40mg bid   2/6- 211- see by cardiology, advised to continue torsemide 40m bid x 5 days  2/7- 210 2/8- 210  2/9- 211  2/  2/11- 213  2/    CKD3 - renal function stable. Cr 1.61, eGFR 27. They are scheduled with nephrology in March     Most recent labs completed 2/1/2025  Vitamin D normal 48.9.  TSH normal 54.3  Total cholesterol 92, triglycerides 117, HDL 32, LDL 37  Hypothyroidism-TSH normal 1.819    Hemoglobin A1c 6.6, fasting glucose  132    Anemia - Hgb stable 11.9    Patient Care Team:  KARINA Alonzo as PCP - General (Internal Medicine)    Review of Systems   Constitutional:  Positive for unexpected weight change.   Respiratory:  Negative for cough, chest tightness and shortness of breath.    Cardiovascular:  Positive for leg swelling (chronic). Negative for chest pain.   Musculoskeletal:  Positive for gait problem.     Medical History Reviewed by provider this encounter:  Tobacco  Allergies  Meds  Problems  Med Hx  Surg Hx  Fam Hx       Annual Wellness Visit Questionnaire   Isaura is here for her Subsequent Wellness visit.     Health Risk Assessment:   Patient rates overall health as good. Patient feels that their physical health rating is slightly worse. Patient is satisfied with their life. Eyesight was rated as same. Hearing was rated as slightly better. Patient feels that their emotional and mental health rating is slightly better. Patients states they are never, rarely angry. Patient states they are sometimes unusually tired/fatigued. Pain experienced in the last 7 days has been none. Patient states that she has experienced weight loss or gain in last 6 months. Unsure due to Toresimide and monitoring weight     Depression Screening:   PHQ-2 Score: 0      Fall Risk Screening:   In the past year, patient has experienced: history of falling in past year    Number of falls: 2 or more  Injured during fall?: Yes    Feels unsteady when standing or walking?: No    Worried about falling?: Yes      Urinary Incontinence Screening:   Patient has leaked urine accidently in the last six months.     Home Safety:  Patient has trouble with stairs inside or outside of their home. Patient has working smoke alarms and has working carbon monoxide detector. Home safety hazards include: none.     Nutrition:   Current diet is Regular and Other (please comment). Low sodium diet is to be regimented     Medications:   Patient is not currently  taking any over-the-counter supplements. Patient is not able to manage medications.     Activities of Daily Living (ADLs)/Instrumental Activities of Daily Living (IADLs):   Walk and transfer into and out of bed and chair?: No  Dress and groom yourself?: No    Bathe or shower yourself?: No    Feed yourself? Yes  Do your laundry/housekeeping?: No  Manage your money, pay your bills and track your expenses?: No  Make your own meals?: No    Do your own shopping?: No    Advance Care Planning:   Living will: Yes    Durable POA for healthcare: Yes    Advanced directive: Yes      PREVENTIVE SCREENINGS      Cardiovascular Screening:    General: Screening Current      Diabetes Screening:     General: Screening Not Indicated and History Diabetes      Colorectal Cancer Screening:     General: Screening Not Indicated      Breast Cancer Screening:     General: Screening Not Indicated      Cervical Cancer Screening:    General: Screening Not Indicated      Osteoporosis Screening:    General: Screening Not Indicated and History Osteoporosis      Abdominal Aortic Aneurysm (AAA) Screening:        General: Screening Not Indicated      Lung Cancer Screening:     General: Screening Not Indicated    Screening, Brief Intervention, and Referral to Treatment (SBIRT)     Screening  Typical number of drinks in a day: 0  Typical number of drinks in a week: 0  Interpretation: Low risk drinking behavior.    AUDIT-C Screenin) How often did you have a drink containing alcohol in the past year? never  2) How many drinks did you have on a typical day when you were drinking in the past year? 0  3) How often did you have 6 or more drinks on one occasion in the past year? never    AUDIT-C Score: 0  Interpretation: Score 0-2 (female): Negative screen for alcohol misuse    Single Item Drug Screening:  How often have you used an illegal drug (including marijuana) or a prescription medication for non-medical reasons in the past year? never    Single  "Item Drug Screen Score: 0  Interpretation: Negative screen for possible drug use disorder    Social Drivers of Health     Financial Resource Strain: Low Risk  (1/15/2025)    Received from [facility]    Overall Financial Resource Strain (CARDIA)     Difficulty of Paying Living Expenses: Not very hard   Food Insecurity: No Food Insecurity (2/12/2025)    Hunger Vital Sign     Worried About Running Out of Food in the Last Year: Never true     Ran Out of Food in the Last Year: Never true   Transportation Needs: No Transportation Needs (2/12/2025)    PRAPARE - Transportation     Lack of Transportation (Medical): No     Lack of Transportation (Non-Medical): No   Housing Stability: Unknown (2/12/2025)    Housing Stability Vital Sign     Unable to Pay for Housing in the Last Year: No     Homeless in the Last Year: No   Utilities: Not At Risk (2/12/2025)    [facility] Utilities     Threatened with loss of utilities: No     No results found.    Objective   /84 (Patient Position: Sitting, Cuff Size: Adult)   Pulse 74   Temp (!) 96.2 °F (35.7 °C) (Tympanic)   Ht 5' 5\" (1.651 m)   Wt 97.1 kg (214 lb)   SpO2 97%   BMI 35.61 kg/m²     Physical Exam  Vitals reviewed.   Constitutional:       General: She is not in acute distress.     Appearance: Normal appearance. She is obese. She is not diaphoretic.   HENT:      Head: Normocephalic and atraumatic.   Eyes:      Conjunctiva/sclera: Conjunctivae normal.   Cardiovascular:      Rate and Rhythm: Normal rate and regular rhythm.   Pulmonary:      Effort: Pulmonary effort is normal. No respiratory distress.      Breath sounds: Normal breath sounds. No wheezing, rhonchi or rales.   Musculoskeletal:      Right lower leg: Edema present.      Left lower leg: Edema present.   Neurological:      Mental Status: She is alert. Mental status is at baseline.      Gait: Gait abnormal (in wheelchair).   Psychiatric:         Mood and Affect: Mood normal.         Behavior: Behavior " normal.         Thought Content: Thought content normal.         Judgment: Judgment normal.

## 2025-02-12 NOTE — ASSESSMENT & PLAN NOTE
Wt Readings from Last 3 Encounters:   02/12/25 97.1 kg (214 lb)   12/19/24 93.2 kg (205 lb 8 oz)   12/11/24 95.3 kg (210 lb)     Following with [facility] cardiology  Recently hospitalized in January requiring IV diuresis  restart torsemide 40mg bid (weight trending up)  Lungs clear on exam  Continue daily weights  Check BMP today  Strict low sodium diet < 2000mg daily   Scheduled for echo 2/18  Follow up with cardiology as scheduled 2/25          Orders:    Basic metabolic panel; Future    torsemide (DEMADEX) 20 mg tablet; Take 2 tablets (40 mg total) by mouth 2 (two) times a day

## 2025-02-13 LAB
ANION GAP SERPL CALCULATED.3IONS-SCNC: 10 MMOL/L (ref 4–13)
BUN SERPL-MCNC: 53 MG/DL (ref 5–25)
CALCIUM SERPL-MCNC: 9.4 MG/DL (ref 8.4–10.2)
CHLORIDE SERPL-SCNC: 94 MMOL/L (ref 96–108)
CO2 SERPL-SCNC: 32 MMOL/L (ref 21–32)
CREAT SERPL-MCNC: 1.73 MG/DL (ref 0.6–1.3)
GFR SERPL CREATININE-BSD FRML MDRD: 25 ML/MIN/1.73SQ M
GLUCOSE P FAST SERPL-MCNC: 169 MG/DL (ref 65–99)
POTASSIUM SERPL-SCNC: 3.9 MMOL/L (ref 3.5–5.3)
SODIUM SERPL-SCNC: 136 MMOL/L (ref 135–147)

## 2025-02-13 NOTE — TELEPHONE ENCOUNTER
MERRICK    Patient was seen at cardiology on 2/7 and she was offered IV Lasix but patient refused it because of incontinence concerns.  Patient has follow up with cardiology on 2/25.  Also gave them Fax # [facility] Fax # [phone] they would like to fax some BW for the patient.  Thank you!!

## 2025-02-18 ENCOUNTER — TELEPHONE (OUTPATIENT)
Age: OVER 89
End: 2025-02-18

## 2025-02-18 NOTE — TELEPHONE ENCOUNTER
"----- Message from Anjelica JEROME sent at 2/5/2025  4:38 PM EST -----  Sharath from [facility] calling because he wanted to inform Dr. Wing that there was an incident this morning and pt hit her left forearm on a machine and it opened up a skin tear and they need an official dressing to clean it with if not already ordered in chart. I could not verify demographics for pt w/ him and he explained the kind of dressing it is; he said \"cleansed w/ normal saline and apply layer of Xeroform gauze to wound and cover with occulivise dressing, change weekly.\" He stated she is stable and has no SOB and vitals are within limits and her cardiologist was notified early this AM. If office would like to discuss further, please reach out to Sharath at [phone].  "

## 2025-02-21 ENCOUNTER — APPOINTMENT (OUTPATIENT)
Dept: LAB | Facility: IMAGING CENTER | Age: OVER 89
End: 2025-02-21
Payer: COMMERCIAL

## 2025-02-21 DIAGNOSIS — I50.33 ACUTE ON CHRONIC DIASTOLIC CONGESTIVE HEART FAILURE (HCC): ICD-10-CM

## 2025-02-21 LAB
ANION GAP SERPL CALCULATED.3IONS-SCNC: 11 MMOL/L (ref 4–13)
BNP SERPL-MCNC: 133 PG/ML (ref 0–100)
BUN SERPL-MCNC: 48 MG/DL (ref 5–25)
CALCIUM SERPL-MCNC: 10.4 MG/DL (ref 8.4–10.2)
CHLORIDE SERPL-SCNC: 96 MMOL/L (ref 96–108)
CO2 SERPL-SCNC: 31 MMOL/L (ref 21–32)
CREAT SERPL-MCNC: 1.78 MG/DL (ref 0.6–1.3)
GFR SERPL CREATININE-BSD FRML MDRD: 24 ML/MIN/1.73SQ M
GLUCOSE SERPL-MCNC: 147 MG/DL (ref 65–140)
POTASSIUM SERPL-SCNC: 4.3 MMOL/L (ref 3.5–5.3)
SODIUM SERPL-SCNC: 138 MMOL/L (ref 135–147)

## 2025-02-21 PROCEDURE — 83880 ASSAY OF NATRIURETIC PEPTIDE: CPT

## 2025-02-21 PROCEDURE — 36415 COLL VENOUS BLD VENIPUNCTURE: CPT

## 2025-02-21 PROCEDURE — 80048 BASIC METABOLIC PNL TOTAL CA: CPT

## 2025-03-03 NOTE — TELEPHONE ENCOUNTER
Resident requesting that her straight order Miralax powder daily in AM, be changed to PRN as needed.     Please advise patient PCP not in office

## 2025-03-11 ENCOUNTER — APPOINTMENT (OUTPATIENT)
Dept: LAB | Facility: IMAGING CENTER | Age: OVER 89
End: 2025-03-11
Payer: COMMERCIAL

## 2025-03-11 ENCOUNTER — TELEPHONE (OUTPATIENT)
Dept: INTERNAL MEDICINE CLINIC | Facility: OTHER | Age: OVER 89
End: 2025-03-11

## 2025-03-11 DIAGNOSIS — I50.32 CHRONIC HEART FAILURE WITH PRESERVED EJECTION FRACTION (HCC): ICD-10-CM

## 2025-03-11 LAB
ANION GAP SERPL CALCULATED.3IONS-SCNC: 11 MMOL/L (ref 4–13)
BNP SERPL-MCNC: 193 PG/ML (ref 0–100)
BUN SERPL-MCNC: 49 MG/DL (ref 5–25)
CALCIUM SERPL-MCNC: 10.2 MG/DL (ref 8.4–10.2)
CHLORIDE SERPL-SCNC: 96 MMOL/L (ref 96–108)
CO2 SERPL-SCNC: 29 MMOL/L (ref 21–32)
CREAT SERPL-MCNC: 1.93 MG/DL (ref 0.6–1.3)
GFR SERPL CREATININE-BSD FRML MDRD: 22 ML/MIN/1.73SQ M
GLUCOSE P FAST SERPL-MCNC: 199 MG/DL (ref 65–99)
POTASSIUM SERPL-SCNC: 4 MMOL/L (ref 3.5–5.3)
SODIUM SERPL-SCNC: 136 MMOL/L (ref 135–147)

## 2025-03-11 PROCEDURE — 83880 ASSAY OF NATRIURETIC PEPTIDE: CPT

## 2025-03-11 PROCEDURE — 36415 COLL VENOUS BLD VENIPUNCTURE: CPT

## 2025-03-11 PROCEDURE — 80048 BASIC METABOLIC PNL TOTAL CA: CPT

## 2025-03-13 ENCOUNTER — TELEPHONE (OUTPATIENT)
Age: OVER 89
End: 2025-03-13

## 2025-03-13 NOTE — TELEPHONE ENCOUNTER
Please fax physician order sheet to [facility]. Our med list already reflects this medication as PRN    Class II - visualization of the soft palate, fauces, and uvula

## 2025-03-13 NOTE — TELEPHONE ENCOUNTER
[facility]. They are requesting a phone call when orders are faxed. Thank you

## 2025-03-19 ENCOUNTER — OFFICE VISIT (OUTPATIENT)
Dept: INTERNAL MEDICINE CLINIC | Facility: OTHER | Age: OVER 89
End: 2025-03-19
Payer: COMMERCIAL

## 2025-03-19 VITALS
BODY MASS INDEX: 35.61 KG/M2 | OXYGEN SATURATION: 99 % | DIASTOLIC BLOOD PRESSURE: 66 MMHG | HEIGHT: 65 IN | HEART RATE: 70 BPM | TEMPERATURE: 97.8 F | SYSTOLIC BLOOD PRESSURE: 126 MMHG

## 2025-03-19 DIAGNOSIS — L89.322 PRESSURE INJURY OF LEFT BUTTOCK, STAGE 2 (HCC): Primary | ICD-10-CM

## 2025-03-19 PROCEDURE — G2211 COMPLEX E/M VISIT ADD ON: HCPCS | Performed by: NURSE PRACTITIONER

## 2025-03-19 PROCEDURE — 99213 OFFICE O/P EST LOW 20 MIN: CPT | Performed by: NURSE PRACTITIONER

## 2025-03-19 NOTE — PROGRESS NOTES
"Name: Isaura Burnett      : 1934      MRN: [id number]  Encounter Provider: KARINA Alonzo  Encounter Date: 3/19/2025   Encounter department: [facility]  :  Assessment & Plan  Pressure injury of left buttock, stage 2 (HCC)  Unable to assess wound in office as she is unable to stand up from her wheelchair   She uses a sit and spin device at her assisted living facility  Assisted living advised on stage 2 pressure injury to left buttock which is recurrent for patient  Orders placed for [facility] wound care to assess and treat patient   Advised tylenol as needed for pain   Orders:    EXTERNAL Referral to Home Health; Future           History of Present Illness   HPI    Patient presents today with concern for recurrent stage 2 pressure ulcer of her left buttock. She resides at [facility]. They are requesting a referral to wound care, they use [facility] in their facility.   Patient states she is unable to get up out of her wheelchair and unable to stand. She states at her facility they use a sit and spin where she is able to hold on while they can assess the area.   She states this wound has been present several times before.   She denies any fever or chills  She has pain when putting weight on her left buttock.     Review of Systems   Constitutional:  Negative for chills and fever.   Skin:  Positive for wound.       Objective   /66 (BP Location: Left arm, Patient Position: Sitting, Cuff Size: Standard)   Pulse 70   Temp 97.8 °F (36.6 °C) (Temporal)   Ht 5' 5\" (1.651 m)   SpO2 99%   BMI 35.61 kg/m²      Physical Exam  Vitals reviewed.   Constitutional:       General: She is not in acute distress.     Appearance: Normal appearance. She is not diaphoretic.   HENT:      Head: Normocephalic and atraumatic.   Cardiovascular:      Rate and Rhythm: Normal rate and regular rhythm.      Heart sounds: Normal heart sounds.   Pulmonary:      " Effort: Pulmonary effort is normal. No respiratory distress.      Breath sounds: Normal breath sounds. No wheezing, rhonchi or rales.   Musculoskeletal:      Right lower leg: Edema present.      Left lower leg: Edema present.   Neurological:      Mental Status: She is alert and oriented to person, place, and time. Mental status is at baseline.      Gait: Gait abnormal (in wheelchair).   Psychiatric:         Mood and Affect: Mood normal.         Behavior: Behavior normal.

## 2025-03-20 ENCOUNTER — TELEPHONE (OUTPATIENT)
Dept: ADMINISTRATIVE | Facility: OTHER | Age: OVER 89
End: 2025-03-20

## 2025-03-20 NOTE — TELEPHONE ENCOUNTER
----- Message from Renate JEROME sent at 3/19/2025  3:46 PM EDT -----  .03/19/25 3:46 PM    Hello, our patient Isaura Burnett has had Diabetic Eye Exam completed/performed. Please assist in updating the patient chart by making an External outreach to [facility] located in [facility] [address] [place] [id number] [phone]. The date of service is sometime within the pass year.    Thank you,  Renate Zacarias MA  [facility] PRIMARY CARE [place]

## 2025-03-20 NOTE — TELEPHONE ENCOUNTER
Upon review of the In Basket request and the patient's chart, initial outreach has been made via fax to facility. Please see Contacts section for details.     Thank you  Kia Dobson

## 2025-03-20 NOTE — LETTER
Diabetic Eye Exam Form    Date Requested: 25  Patient: Isaura Burnett  Patient : 1934   Referring Provider: KARINA Alonzo      DIABETIC Eye Exam Date _______________________________      Type of Exam MUST be documented for Diabetic Eye Exams. Please CHECK ONE.     Retinal Exam       Dilated Retinal Exam       OCT       Optomap-Iris Exam      Fundus Photography       Left Eye - Please check Retinopathy or No Retinopathy        Exam did show retinopathy    Exam did not show retinopathy       Right Eye - Please check Retinopathy or No Retinopathy       Exam did show retinopathy    Exam did not show retinopathy       Comments __________________________________________________________    Practice Providing Exam ______________________________________________    Exam Performed By (print name) _______________________________________      Provider Signature ___________________________________________________      These reports are needed for  compliance.  Please fax this completed form and a copy of the Diabetic Eye Exam report to our office located at [address] [place] [address] as soon as possible via Fax [phone] attention Kia: Phone [phone]  We thank you for your assistance in treating our mutual patient.

## 2025-04-07 DIAGNOSIS — I50.32 CHRONIC DIASTOLIC HEART FAILURE (HCC): Primary | ICD-10-CM

## 2025-04-07 NOTE — PROGRESS NOTES
Received communication that [facility] requesting an order for evaluation and treatment for hospice services.  Order placed in patient's chart.  She does have upcoming appointment in office 4/9

## 2025-04-10 ENCOUNTER — TELEPHONE (OUTPATIENT)
Age: OVER 89
End: 2025-04-10